# Patient Record
Sex: MALE | Race: WHITE | NOT HISPANIC OR LATINO | Employment: OTHER | URBAN - METROPOLITAN AREA
[De-identification: names, ages, dates, MRNs, and addresses within clinical notes are randomized per-mention and may not be internally consistent; named-entity substitution may affect disease eponyms.]

---

## 2018-05-15 ENCOUNTER — APPOINTMENT (OUTPATIENT)
Dept: RADIOLOGY | Facility: MEDICAL CENTER | Age: 83
End: 2018-05-15
Attending: STUDENT IN AN ORGANIZED HEALTH CARE EDUCATION/TRAINING PROGRAM
Payer: MEDICARE

## 2018-05-15 ENCOUNTER — HOSPITAL ENCOUNTER (OUTPATIENT)
Facility: MEDICAL CENTER | Age: 83
End: 2018-05-18
Attending: EMERGENCY MEDICINE | Admitting: INTERNAL MEDICINE
Payer: MEDICARE

## 2018-05-15 ENCOUNTER — APPOINTMENT (OUTPATIENT)
Dept: RADIOLOGY | Facility: MEDICAL CENTER | Age: 83
End: 2018-05-15
Attending: EMERGENCY MEDICINE
Payer: MEDICARE
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
## 2018-05-15 DIAGNOSIS — S00.83XA CONTUSION OF CHIN, INITIAL ENCOUNTER: ICD-10-CM

## 2018-05-15 DIAGNOSIS — S01.511A COMPLICATED LACERATION OF LIP, INITIAL ENCOUNTER: ICD-10-CM

## 2018-05-15 DIAGNOSIS — R04.0 EPISTAXIS: ICD-10-CM

## 2018-05-15 DIAGNOSIS — S02.92XA CLOSED FRACTURE OF FACIAL BONE DUE TO FALL, INITIAL ENCOUNTER (HCC): Primary | ICD-10-CM

## 2018-05-15 DIAGNOSIS — S02.92XA MULTIPLE FACIAL FRACTURES, CLOSED, INITIAL ENCOUNTER (HCC): ICD-10-CM

## 2018-05-15 PROBLEM — Z95.0 PACEMAKER: Status: ACTIVE | Noted: 2018-05-15

## 2018-05-15 PROBLEM — I10 ESSENTIAL HYPERTENSION: Status: ACTIVE | Noted: 2018-05-15

## 2018-05-15 PROBLEM — E11.9 TYPE 2 DIABETES MELLITUS, WITHOUT LONG-TERM CURRENT USE OF INSULIN (HCC): Status: ACTIVE | Noted: 2018-05-15

## 2018-05-15 PROBLEM — I27.20 PULMONARY HYPERTENSION (HCC): Status: ACTIVE | Noted: 2018-05-15

## 2018-05-15 PROBLEM — I34.1 MVP (MITRAL VALVE PROLAPSE): Status: ACTIVE | Noted: 2018-05-15

## 2018-05-15 PROBLEM — R74.8 ELEVATED ALKALINE PHOSPHATASE LEVEL: Status: ACTIVE | Noted: 2018-05-15

## 2018-05-15 PROBLEM — D64.9 NORMOCYTIC ANEMIA: Status: ACTIVE | Noted: 2018-05-15

## 2018-05-15 PROBLEM — E03.9 HYPOTHYROID: Status: ACTIVE | Noted: 2018-05-15

## 2018-05-15 PROBLEM — D72.829 LEUKOCYTOSIS: Status: ACTIVE | Noted: 2018-05-15

## 2018-05-15 PROBLEM — I25.810 CORONARY ARTERY DISEASE INVOLVING CORONARY BYPASS GRAFT: Status: ACTIVE | Noted: 2018-05-15

## 2018-05-15 PROBLEM — D69.6 THROMBOCYTOPENIA (HCC): Status: ACTIVE | Noted: 2018-05-15

## 2018-05-15 LAB
ABO GROUP BLD: NORMAL
ABO GROUP BLD: NORMAL
ALBUMIN SERPL BCP-MCNC: 3.6 G/DL (ref 3.2–4.9)
ALBUMIN/GLOB SERPL: 1.4 G/DL
ALP SERPL-CCNC: 130 U/L (ref 30–99)
ALT SERPL-CCNC: 21 U/L (ref 2–50)
ANION GAP SERPL CALC-SCNC: 8 MMOL/L (ref 0–11.9)
APTT PPP: 32.6 SEC (ref 24.7–36)
AST SERPL-CCNC: 23 U/L (ref 12–45)
BILIRUB SERPL-MCNC: 1 MG/DL (ref 0.1–1.5)
BLD GP AB SCN SERPL QL: NORMAL
BUN SERPL-MCNC: 12 MG/DL (ref 8–22)
CALCIUM SERPL-MCNC: 8.8 MG/DL (ref 8.5–10.5)
CHLORIDE SERPL-SCNC: 103 MMOL/L (ref 96–112)
CO2 SERPL-SCNC: 25 MMOL/L (ref 20–33)
CREAT SERPL-MCNC: 0.68 MG/DL (ref 0.5–1.4)
ERYTHROCYTE [DISTWIDTH] IN BLOOD BY AUTOMATED COUNT: 44.7 FL (ref 35.9–50)
ETHANOL BLD-MCNC: 0 G/DL
GLOBULIN SER CALC-MCNC: 2.5 G/DL (ref 1.9–3.5)
GLUCOSE SERPL-MCNC: 224 MG/DL (ref 65–99)
HCT VFR BLD AUTO: 37.6 % (ref 42–52)
HGB BLD-MCNC: 12.5 G/DL (ref 14–18)
INR PPP: 0.94 (ref 0.87–1.13)
MCH RBC QN AUTO: 29.6 PG (ref 27–33)
MCHC RBC AUTO-ENTMCNC: 33.2 G/DL (ref 33.7–35.3)
MCV RBC AUTO: 88.9 FL (ref 81.4–97.8)
PLATELET # BLD AUTO: 146 K/UL (ref 164–446)
PMV BLD AUTO: 11.4 FL (ref 9–12.9)
POTASSIUM SERPL-SCNC: 4 MMOL/L (ref 3.6–5.5)
PROT SERPL-MCNC: 6.1 G/DL (ref 6–8.2)
PROTHROMBIN TIME: 12.3 SEC (ref 12–14.6)
RBC # BLD AUTO: 4.23 M/UL (ref 4.7–6.1)
RH BLD: NORMAL
RH BLD: NORMAL
SODIUM SERPL-SCNC: 136 MMOL/L (ref 135–145)
WBC # BLD AUTO: 12.6 K/UL (ref 4.8–10.8)

## 2018-05-15 PROCEDURE — 86900 BLOOD TYPING SEROLOGIC ABO: CPT

## 2018-05-15 PROCEDURE — 86850 RBC ANTIBODY SCREEN: CPT

## 2018-05-15 PROCEDURE — 305948 HCHG GREEN TRAUMA ACT PRE-NOTIFY NO CC

## 2018-05-15 PROCEDURE — 304999 HCHG REPAIR-SIMPLE/INTERMED LEVEL 1

## 2018-05-15 PROCEDURE — G0378 HOSPITAL OBSERVATION PER HR: HCPCS

## 2018-05-15 PROCEDURE — 700111 HCHG RX REV CODE 636 W/ 250 OVERRIDE (IP): Performed by: INTERNAL MEDICINE

## 2018-05-15 PROCEDURE — 36415 COLL VENOUS BLD VENIPUNCTURE: CPT

## 2018-05-15 PROCEDURE — 85027 COMPLETE CBC AUTOMATED: CPT

## 2018-05-15 PROCEDURE — 304217 HCHG IRRIGATION SYSTEM

## 2018-05-15 PROCEDURE — 70486 CT MAXILLOFACIAL W/O DYE: CPT

## 2018-05-15 PROCEDURE — 700102 HCHG RX REV CODE 250 W/ 637 OVERRIDE(OP): Performed by: STUDENT IN AN ORGANIZED HEALTH CARE EDUCATION/TRAINING PROGRAM

## 2018-05-15 PROCEDURE — 700105 HCHG RX REV CODE 258: Performed by: INTERNAL MEDICINE

## 2018-05-15 PROCEDURE — 85610 PROTHROMBIN TIME: CPT

## 2018-05-15 PROCEDURE — 86901 BLOOD TYPING SEROLOGIC RH(D): CPT

## 2018-05-15 PROCEDURE — 303747 HCHG EXTRA SUTURE

## 2018-05-15 PROCEDURE — 96365 THER/PROPH/DIAG IV INF INIT: CPT | Mod: XU

## 2018-05-15 PROCEDURE — 85730 THROMBOPLASTIN TIME PARTIAL: CPT

## 2018-05-15 PROCEDURE — 99285 EMERGENCY DEPT VISIT HI MDM: CPT

## 2018-05-15 PROCEDURE — A9270 NON-COVERED ITEM OR SERVICE: HCPCS | Performed by: STUDENT IN AN ORGANIZED HEALTH CARE EDUCATION/TRAINING PROGRAM

## 2018-05-15 PROCEDURE — 80307 DRUG TEST PRSMV CHEM ANLYZR: CPT

## 2018-05-15 PROCEDURE — 80053 COMPREHEN METABOLIC PANEL: CPT

## 2018-05-15 PROCEDURE — 71045 X-RAY EXAM CHEST 1 VIEW: CPT

## 2018-05-15 RX ORDER — ACETAMINOPHEN 500 MG
1000 TABLET ORAL 3 TIMES DAILY
Status: DISCONTINUED | OUTPATIENT
Start: 2018-05-15 | End: 2018-05-17

## 2018-05-15 RX ORDER — LEVOTHYROXINE SODIUM 0.07 MG/1
75 TABLET ORAL
COMMUNITY

## 2018-05-15 RX ORDER — POLYETHYLENE GLYCOL 3350 17 G/17G
1 POWDER, FOR SOLUTION ORAL
Status: DISCONTINUED | OUTPATIENT
Start: 2018-05-15 | End: 2018-05-18 | Stop reason: HOSPADM

## 2018-05-15 RX ORDER — ERGOCALCIFEROL 1.25 MG/1
50000 CAPSULE ORAL
Status: DISCONTINUED | OUTPATIENT
Start: 2018-05-15 | End: 2018-05-18 | Stop reason: HOSPADM

## 2018-05-15 RX ORDER — ATORVASTATIN CALCIUM 80 MG/1
80 TABLET, FILM COATED ORAL NIGHTLY
COMMUNITY

## 2018-05-15 RX ORDER — LEVOTHYROXINE SODIUM 0.03 MG/1
75 TABLET ORAL
Status: DISCONTINUED | OUTPATIENT
Start: 2018-05-16 | End: 2018-05-18 | Stop reason: HOSPADM

## 2018-05-15 RX ORDER — BISACODYL 10 MG
10 SUPPOSITORY, RECTAL RECTAL
Status: DISCONTINUED | OUTPATIENT
Start: 2018-05-15 | End: 2018-05-18 | Stop reason: HOSPADM

## 2018-05-15 RX ORDER — TRAZODONE HYDROCHLORIDE 50 MG/1
50 TABLET ORAL
Status: DISCONTINUED | OUTPATIENT
Start: 2018-05-15 | End: 2018-05-18 | Stop reason: HOSPADM

## 2018-05-15 RX ORDER — ATORVASTATIN CALCIUM 40 MG/1
80 TABLET, FILM COATED ORAL NIGHTLY
Status: DISCONTINUED | OUTPATIENT
Start: 2018-05-15 | End: 2018-05-18 | Stop reason: HOSPADM

## 2018-05-15 RX ORDER — SODIUM CHLORIDE 9 MG/ML
INJECTION, SOLUTION INTRAVENOUS CONTINUOUS
Status: DISCONTINUED | OUTPATIENT
Start: 2018-05-15 | End: 2018-05-17

## 2018-05-15 RX ORDER — LISINOPRIL 10 MG/1
10 TABLET ORAL DAILY
COMMUNITY

## 2018-05-15 RX ORDER — LISINOPRIL 10 MG/1
10 TABLET ORAL DAILY
Status: DISCONTINUED | OUTPATIENT
Start: 2018-05-16 | End: 2018-05-18 | Stop reason: HOSPADM

## 2018-05-15 RX ORDER — ONDANSETRON 4 MG/1
4 TABLET, ORALLY DISINTEGRATING ORAL EVERY 4 HOURS PRN
Status: DISCONTINUED | OUTPATIENT
Start: 2018-05-15 | End: 2018-05-18 | Stop reason: HOSPADM

## 2018-05-15 RX ORDER — METFORMIN HYDROCHLORIDE 500 MG/1
1000 TABLET, EXTENDED RELEASE ORAL
COMMUNITY

## 2018-05-15 RX ORDER — ONDANSETRON 2 MG/ML
4 INJECTION INTRAMUSCULAR; INTRAVENOUS EVERY 4 HOURS PRN
Status: DISCONTINUED | OUTPATIENT
Start: 2018-05-15 | End: 2018-05-18 | Stop reason: HOSPADM

## 2018-05-15 RX ORDER — OXYCODONE HYDROCHLORIDE 5 MG/1
5 TABLET ORAL EVERY 4 HOURS PRN
Status: DISCONTINUED | OUTPATIENT
Start: 2018-05-15 | End: 2018-05-17

## 2018-05-15 RX ORDER — ERGOCALCIFEROL 1.25 MG/1
50000 CAPSULE ORAL
COMMUNITY

## 2018-05-15 RX ORDER — AMOXICILLIN 250 MG
2 CAPSULE ORAL 2 TIMES DAILY
Status: DISCONTINUED | OUTPATIENT
Start: 2018-05-15 | End: 2018-05-18 | Stop reason: HOSPADM

## 2018-05-15 RX ADMIN — OXYCODONE HYDROCHLORIDE 5 MG: 5 TABLET ORAL at 21:33

## 2018-05-15 RX ADMIN — ATORVASTATIN CALCIUM 80 MG: 40 TABLET, FILM COATED ORAL at 21:53

## 2018-05-15 RX ADMIN — ACETAMINOPHEN 1000 MG: 500 TABLET ORAL at 21:38

## 2018-05-15 RX ADMIN — AMPICILLIN SODIUM AND SULBACTAM SODIUM 3 G: 2; 1 INJECTION, POWDER, FOR SOLUTION INTRAMUSCULAR; INTRAVENOUS at 21:42

## 2018-05-15 RX ADMIN — SODIUM CHLORIDE: 9 INJECTION, SOLUTION INTRAVENOUS at 21:44

## 2018-05-15 ASSESSMENT — PAIN SCALES - GENERAL
PAINLEVEL_OUTOF10: 5
PAINLEVEL_OUTOF10: 8

## 2018-05-15 ASSESSMENT — LIFESTYLE VARIABLES: DO YOU DRINK ALCOHOL: NO

## 2018-05-16 LAB
ALBUMIN SERPL BCP-MCNC: 3.8 G/DL (ref 3.2–4.9)
ALBUMIN/GLOB SERPL: 1.4 G/DL
ALP SERPL-CCNC: 129 U/L (ref 30–99)
ALT SERPL-CCNC: 18 U/L (ref 2–50)
ANION GAP SERPL CALC-SCNC: 8 MMOL/L (ref 0–11.9)
AST SERPL-CCNC: 18 U/L (ref 12–45)
BASOPHILS # BLD AUTO: 0.3 % (ref 0–1.8)
BASOPHILS # BLD: 0.03 K/UL (ref 0–0.12)
BILIRUB SERPL-MCNC: 1 MG/DL (ref 0.1–1.5)
BUN SERPL-MCNC: 16 MG/DL (ref 8–22)
CALCIUM SERPL-MCNC: 8.9 MG/DL (ref 8.5–10.5)
CHLORIDE SERPL-SCNC: 104 MMOL/L (ref 96–112)
CO2 SERPL-SCNC: 24 MMOL/L (ref 20–33)
CREAT SERPL-MCNC: 0.67 MG/DL (ref 0.5–1.4)
EOSINOPHIL # BLD AUTO: 0.02 K/UL (ref 0–0.51)
EOSINOPHIL NFR BLD: 0.2 % (ref 0–6.9)
ERYTHROCYTE [DISTWIDTH] IN BLOOD BY AUTOMATED COUNT: 45 FL (ref 35.9–50)
GLOBULIN SER CALC-MCNC: 2.7 G/DL (ref 1.9–3.5)
GLUCOSE SERPL-MCNC: 167 MG/DL (ref 65–99)
HCT VFR BLD AUTO: 37.8 % (ref 42–52)
HGB BLD-MCNC: 12.8 G/DL (ref 14–18)
IMM GRANULOCYTES # BLD AUTO: 0.07 K/UL (ref 0–0.11)
IMM GRANULOCYTES NFR BLD AUTO: 0.6 % (ref 0–0.9)
LYMPHOCYTES # BLD AUTO: 1.61 K/UL (ref 1–4.8)
LYMPHOCYTES NFR BLD: 14 % (ref 22–41)
MCH RBC QN AUTO: 29.8 PG (ref 27–33)
MCHC RBC AUTO-ENTMCNC: 33.9 G/DL (ref 33.7–35.3)
MCV RBC AUTO: 88.1 FL (ref 81.4–97.8)
MONOCYTES # BLD AUTO: 1.27 K/UL (ref 0–0.85)
MONOCYTES NFR BLD AUTO: 11 % (ref 0–13.4)
NEUTROPHILS # BLD AUTO: 8.5 K/UL (ref 1.82–7.42)
NEUTROPHILS NFR BLD: 73.9 % (ref 44–72)
NRBC # BLD AUTO: 0 K/UL
NRBC BLD-RTO: 0 /100 WBC
PLATELET # BLD AUTO: 152 K/UL (ref 164–446)
PMV BLD AUTO: 10.4 FL (ref 9–12.9)
POTASSIUM SERPL-SCNC: 4.1 MMOL/L (ref 3.6–5.5)
PROT SERPL-MCNC: 6.5 G/DL (ref 6–8.2)
RBC # BLD AUTO: 4.29 M/UL (ref 4.7–6.1)
SODIUM SERPL-SCNC: 136 MMOL/L (ref 135–145)
WBC # BLD AUTO: 11.5 K/UL (ref 4.8–10.8)

## 2018-05-16 PROCEDURE — G8978 MOBILITY CURRENT STATUS: HCPCS | Mod: CI

## 2018-05-16 PROCEDURE — 80053 COMPREHEN METABOLIC PANEL: CPT

## 2018-05-16 PROCEDURE — A9270 NON-COVERED ITEM OR SERVICE: HCPCS | Performed by: STUDENT IN AN ORGANIZED HEALTH CARE EDUCATION/TRAINING PROGRAM

## 2018-05-16 PROCEDURE — 700111 HCHG RX REV CODE 636 W/ 250 OVERRIDE (IP): Performed by: INTERNAL MEDICINE

## 2018-05-16 PROCEDURE — 85025 COMPLETE CBC W/AUTO DIFF WBC: CPT

## 2018-05-16 PROCEDURE — 700105 HCHG RX REV CODE 258: Performed by: INTERNAL MEDICINE

## 2018-05-16 PROCEDURE — G8997 SWALLOW GOAL STATUS: HCPCS | Mod: CI

## 2018-05-16 PROCEDURE — 700102 HCHG RX REV CODE 250 W/ 637 OVERRIDE(OP): Performed by: STUDENT IN AN ORGANIZED HEALTH CARE EDUCATION/TRAINING PROGRAM

## 2018-05-16 PROCEDURE — G0378 HOSPITAL OBSERVATION PER HR: HCPCS

## 2018-05-16 PROCEDURE — G8980 MOBILITY D/C STATUS: HCPCS | Mod: CI

## 2018-05-16 PROCEDURE — G8987 SELF CARE CURRENT STATUS: HCPCS | Mod: CJ

## 2018-05-16 PROCEDURE — 97165 OT EVAL LOW COMPLEX 30 MIN: CPT

## 2018-05-16 PROCEDURE — 92610 EVALUATE SWALLOWING FUNCTION: CPT

## 2018-05-16 PROCEDURE — 97161 PT EVAL LOW COMPLEX 20 MIN: CPT

## 2018-05-16 PROCEDURE — 99226 PR SUBSEQUENT OBSERVATION CARE,LEVEL III: CPT | Mod: GC | Performed by: INTERNAL MEDICINE

## 2018-05-16 PROCEDURE — G8979 MOBILITY GOAL STATUS: HCPCS | Mod: CI

## 2018-05-16 PROCEDURE — 36415 COLL VENOUS BLD VENIPUNCTURE: CPT

## 2018-05-16 PROCEDURE — G8988 SELF CARE GOAL STATUS: HCPCS | Mod: CI

## 2018-05-16 PROCEDURE — 96366 THER/PROPH/DIAG IV INF ADDON: CPT

## 2018-05-16 PROCEDURE — G8996 SWALLOW CURRENT STATUS: HCPCS | Mod: CI

## 2018-05-16 RX ORDER — ECHINACEA PURPUREA EXTRACT 125 MG
2 TABLET ORAL
Status: DISCONTINUED | OUTPATIENT
Start: 2018-05-16 | End: 2018-05-18 | Stop reason: HOSPADM

## 2018-05-16 RX ORDER — FLUTICASONE PROPIONATE 50 MCG
2 SPRAY, SUSPENSION (ML) NASAL DAILY
Status: DISCONTINUED | OUTPATIENT
Start: 2018-05-16 | End: 2018-05-18 | Stop reason: HOSPADM

## 2018-05-16 RX ORDER — CHLORHEXIDINE GLUCONATE ORAL RINSE 1.2 MG/ML
15 SOLUTION DENTAL 4 TIMES DAILY PRN
Status: DISCONTINUED | OUTPATIENT
Start: 2018-05-16 | End: 2018-05-18 | Stop reason: HOSPADM

## 2018-05-16 RX ADMIN — OXYCODONE HYDROCHLORIDE 5 MG: 5 TABLET ORAL at 20:49

## 2018-05-16 RX ADMIN — ATORVASTATIN CALCIUM 80 MG: 40 TABLET, FILM COATED ORAL at 20:50

## 2018-05-16 RX ADMIN — LEVOTHYROXINE SODIUM 75 MCG: 25 TABLET ORAL at 06:23

## 2018-05-16 RX ADMIN — LISINOPRIL 10 MG: 10 TABLET ORAL at 09:26

## 2018-05-16 RX ADMIN — OXYCODONE HYDROCHLORIDE 5 MG: 5 TABLET ORAL at 16:39

## 2018-05-16 RX ADMIN — OXYCODONE HYDROCHLORIDE 5 MG: 5 TABLET ORAL at 09:26

## 2018-05-16 RX ADMIN — AMPICILLIN SODIUM AND SULBACTAM SODIUM 3 G: 2; 1 INJECTION, POWDER, FOR SOLUTION INTRAMUSCULAR; INTRAVENOUS at 04:38

## 2018-05-16 RX ADMIN — ACETAMINOPHEN 1000 MG: 500 TABLET ORAL at 09:26

## 2018-05-16 RX ADMIN — FLUTICASONE PROPIONATE 100 MCG: 50 SPRAY, METERED NASAL at 14:15

## 2018-05-16 RX ADMIN — ACETAMINOPHEN 1000 MG: 500 TABLET ORAL at 14:15

## 2018-05-16 RX ADMIN — CHLORHEXIDINE GLUCONATE 15 ML: 1.2 RINSE ORAL at 16:39

## 2018-05-16 RX ADMIN — OXYCODONE HYDROCHLORIDE 5 MG: 5 TABLET ORAL at 04:42

## 2018-05-16 RX ADMIN — ACETAMINOPHEN 1000 MG: 500 TABLET ORAL at 20:49

## 2018-05-16 RX ADMIN — AMPICILLIN SODIUM AND SULBACTAM SODIUM 3 G: 2; 1 INJECTION, POWDER, FOR SOLUTION INTRAMUSCULAR; INTRAVENOUS at 09:26

## 2018-05-16 ASSESSMENT — COGNITIVE AND FUNCTIONAL STATUS - GENERAL
SUGGESTED CMS G CODE MODIFIER MOBILITY: CH
HELP NEEDED FOR BATHING: A LITTLE
TOILETING: A LITTLE
MOBILITY SCORE: 24
PERSONAL GROOMING: A LITTLE
SUGGESTED CMS G CODE MODIFIER DAILY ACTIVITY: CJ
DAILY ACTIVITIY SCORE: 21

## 2018-05-16 ASSESSMENT — PAIN SCALES - GENERAL
PAINLEVEL_OUTOF10: 4
PAINLEVEL_OUTOF10: 6
PAINLEVEL_OUTOF10: 6
PAINLEVEL_OUTOF10: ASSUMED PAIN PRESENT

## 2018-05-16 ASSESSMENT — GAIT ASSESSMENTS
GAIT LEVEL OF ASSIST: SUPERVISED
DISTANCE (FEET): 600

## 2018-05-16 ASSESSMENT — PATIENT HEALTH QUESTIONNAIRE - PHQ9
1. LITTLE INTEREST OR PLEASURE IN DOING THINGS: NOT AT ALL
2. FEELING DOWN, DEPRESSED, IRRITABLE, OR HOPELESS: NOT AT ALL
SUM OF ALL RESPONSES TO PHQ9 QUESTIONS 1 AND 2: 0

## 2018-05-16 ASSESSMENT — LIFESTYLE VARIABLES: EVER_SMOKED: NEVER

## 2018-05-16 ASSESSMENT — ACTIVITIES OF DAILY LIVING (ADL): TOILETING: INDEPENDENT

## 2018-05-16 NOTE — CONSULTS
DATE OF SERVICE:  05/15/2018    CHIEF COMPLAINT:  Facial fractures.    HISTORY OF PRESENT ILLNESS:  Patient is an 86-year-old who has suffered a   ground level fall earlier today, landed on his face.  The patient apparently   did not lose consciousness, but was taken to the emergency room as a trauma.    He was found to have some facial injuries.  I was consulted for evaluation and   treatment of these.    PAST MEDICAL HISTORY:  Significant for history of diabetes.  He has some   hypothyroid He has a pacemaker.    MEDICATIONS:  He takes some atorvastatin, metformin, Synthroid.    ALLERGIES:  He has no known drug allergies.    SOCIAL HISTORY:  Evidently, he does not smoke.    REVIEW OF SYSTEMS:  Negative for headache, fever, visual disturbances, nausea,   vomiting, chest pain, cough, abdominal pain, hematochezia, melena, or   diarrhea.    PHYSICAL EXAMINATION:  GENERAL:  The patient is a pleasant-appearing white male who is lying in bed.  HEENT:  He has some mild abrasions about the face.  He had had some epistaxis   that seems to have stopped at this time.  He is able to open and close his   mouth.  He is edentulous.  He has no palpable step-offs.  NECK:  Supple.  CHEST:  Clear.  ABDOMEN:  Soft.  EXTREMITIES:  Without significant injury.    CT shows multiple essentially nondisplaced fractures in the mid facial region   including bilateral condylar fractures.    ASSESSMENT AND PLAN:  Given the fact that the patient's opening and closing   his mouth without significant difficulty right now, he is otherwise relatively   nontender and as he is edentulous I think what we will do is we will manage   all these fractures expectantly.  I do not think there is any reason that we   would need to take him to the operating room certainly urgently and we may not   need to take him at all.  My plan would be to just observe the patient over   the next week or so.  He could certainly be discharged as soon as he was   cleared by  medicine.  I would have him on a dental soft diet and we had to   follow him up in my office later this week.  We will be available obviously if   the patient is still in the hospital to follow him, but if he is discharged,   we will see him in my office in later this week.       ____________________________________     MD SHELLI SANCHEZ / DESTINEY    DD:  05/15/2018 21:10:44  DT:  05/15/2018 23:11:31    D#:  [id number]  Job#:  [id number]

## 2018-05-16 NOTE — THERAPY
Speech Language Therapy Clinical Swallow Evaluation completed.  Functional Status: pt sitting EOB. Sleepiness notes near end of eval probably partly  r/t recent pain medication. Pt alert, answering all questions accurately, and with intelligible speech. Vocal quality is clear. Pt with trauma and swelling to his face. Trauma to hard palate with probable wound. When pt fell, his top denture plate broke in half causing an abrasion and a laceration to pt's palate, as visualized using a flashlight. Per HandP, pt with non-displaced fx to his hard palate. Pt with probable bruising to the lateral areas of his tongue with no marked swelling. Pt denies pain with swallow. Pt given sips, using a straw, r/t moderate swelling to his lips, of thin water and juice. No delay in swallow and no coughing post swallow. Pt stating jello, given to him earlier, caused pain to his mouth and was difficult for him to manage orally. Pt given 1/2 tsp amounts of applesauce and pudding with no difficulty verbalized, and no oral residue. Pt stating he was able to take whole pills earlier without difficulty. Pt's dgtr is a RN. Educ provided to pt and his dgtr regarding full liquid diet as tolerated and s/s of possible difficulty. This SLP called by Dr. Rudd and informed that pt should have cool or lukewarm food items with avoidance of hot liquids. SLP called pt's nurs to inform her and diet order modified. SLP also called RD regarding pt and she plans to meet with pt today.   Recommendations - Diet: Diet / Liquid Recommendation: Thin full liquids as tolerated with cool and luke warm temperatures, no jello, smoothies, and Boost as tolerated.                           Strategies: Monitor during meals, HOB 90 degrees, slow rate, hold intake with difficulty                           Medication Administration: Medication Administration : Whole with Liquid Wash (as tolerated, float in applesauce as needed)  Plan of Care: Will benefit from Speech  "Therapy 3 times per week  Post-Acute Therapy: Currently anticipate no further skilled therapy needs once patient is discharged from the inpatient setting.Thanks, Nigel    See \"Rehab Therapy-Acute\" Patient Summary Report for complete documentation.   "

## 2018-05-16 NOTE — ED NOTES
87 yo male trauma green transfer for multiple facial fractures after tripping and falling forward. Denies LOC. Sent for displaced nasal bone fx, bilateral jaw fxs, orbital fx and lip laceration. Denies blood thinners.

## 2018-05-16 NOTE — DIETARY
"Nutrition services: Day 0 of admit.  Nicholas Tavares is a 86 y.o. male with admitting DX of extensive facial fractures s/p fall.  Pt seen for special dietary needs per SLP given pt's facial fractures.     Spoke with pt and daughter at bedside. At baseline pt follows a regular diet, is independent, and maintains his weight well. Pt did state that he recently bought some Boost from [employer] - had only tried 1 but said he enjoyed it. Pt unfortunately broke his dentures from his fall, and will be placed on a full liquid diet. Per discussion with SLP pt to only have cool or lukewarm items, communicated this with pt and suggested allowing soups/hot cereal to sit at room temperature for 10-15 min prior to consuming. Also discussed smoothies and Boost supplements, suggested consuming this closer to room temp as well. Pt and daughter verbalized understanding. Encouraged adequate intake of meals and supplements in order to promote healing and prevent weight loss.     Assessment:  Height: 168.9 cm (5' 6.5\")  Weight: 69.2 kg (152 lb 8.9 oz)  Body mass index is 24.25 kg/m².   Diet/Intake: Full liquid    Evaluation:   1. Per discussion with SLP pt to have cool or luke warm full liquids only - communicated with pt  2. Discussed recommendation for Boost with meals with MD - who is agreeable with plan  3. Pt does have hx of diabetes - will provide Boost Glucose Control   4. Reviewed past medical hx, labs, meds, and flowsheets    Recommendations/Plan:  1. Boost Glucose Control and yogurt based smoothies TID with meals   2. Encourage intake of meals and supplements  3. Document intake of all meals and supplements as % taken in ADL's to provide interdisciplinary communication across all shifts.   4. Monitor weight.  5. Nutrition rep will continue to see patient for ongoing meal and snack preferences.   6. RD will monitor for adequate intake, wt trends, and nutrition labs/meds    RD following           "

## 2018-05-16 NOTE — PROGRESS NOTES
Internal Medicine Interval Note  Note Author: Elli Rudd M.D.     Name Nicholas Tavares     1931   Age/Sex 86 y.o. male   MRN [id number]   Code Status FULL CODE     After 5PM or if no immediate response to page, please call for cross-coverage  Attending/Team: Dr. Liang See Patient List for primary contact information  Call [phone] to page    1st Call - Day Intern (R1):   Dr. Rudd 2nd Call - Day Sr. Resident (R2/R3):   Dr. Wallace         Reason for interval visit  (Principal Problem)   Facial fracture due to fall (HCC)    Interval Problem Daily Status Update  (24 hours)   -facial soreness, palatal pain, congestion, subjective SOB (was on oxymask when I spoke with him; however, this was removed and he continued to saturate well). Denies fevers, chills, chest pain, palpitations, lightheadedness, dizziness, n/v/d, other sx. Reports that the fall was mechanical, without prodromal sx.   -he is very independent and does not currently want HH as per OT recs  -SLT evaluated; recs: thin + full liquid, cool to lukewarm temperatures, Boost as tolerated  -PT evaluated: no further acute PT needs  -OT evaluated: noted would benefit from OT 3x/wk following discharge    ROS  Constitutional: Denies fever/chills. + weakness (progressive, months)  Eyes: Denies blurred vision, double vision.  ENT: + congestion. No clear rhinorrhea. + Palatal pain.   Cardiovascular: Denies chest pain, palpitations.  Respiratory: + subjective SOB d/t difficulty opening mouth + nasal congestion. Denies cough.  GI: Denies nausea, vomiting, diarrhea, abdominal pain.  : Denies dysuria, hematuria.  Musculo-skeletal: Denies muscle spasms, joint stiffness.  Skin: + palatal laceration (small) and facial abrasions  Neurological: Denies paresthesias, fasciculations, seizures, focal weakness.  All other systems reviewed and negative    Consultants/Specialty  Plastic surgery    Disposition  Remaining inpatient today, likely to d/c  tomorrow    Quality Measures  Quality-Core Measures   Reviewed items::  Labs reviewed and Medications reviewed  Cedillo catheter::  No Cedillo  DVT prophylaxis pharmacological::  Contraindicated - High bleeding risk  DVT prophylaxis - mechanical:  SCDs  : Unasyn discontinued.      Physical Exam       Vitals:    05/16/18 0007 05/16/18 0335 05/16/18 0730 05/16/18 0926   BP:  134/65 134/63    Pulse:  65 74    Resp:  20 18    Temp:  37.3 °C (99.1 °F) 36.5 °C (97.7 °F)    SpO2:  98% 98% 94%   Weight: 69.2 kg (152 lb 8.9 oz)      Height:         Body mass index is 24.25 kg/m². Weight: 69.2 kg (152 lb 8.9 oz)  Oxygen Therapy:  Pulse Oximetry: 94 %, O2 (LPM): 2, O2 Delivery: None (Room Air)    Physical Exam  General:  Alert and oriented, appears uncomfortable.  HENT: normocephalic, but traumatic with facial swelling, bridge of nose askew, lower lip laceration with sutures in place  Eyes: No scleral icterus, conjunctival injection, or discharge.  Lungs: Clear to auscultation bilaterally without adventitious sounds.  Cardiovascular: Regular rate and rhythm. 2+/6 systolic murmur.  Abdomen:  Benign. No rebound or guarding noted. No suprapubic tenderness.  Extremities: No clubbing, cyanosis, edema.  Musculoskeletal: right sided maxillary defect and deviated nasal septum to the right, moves jaw with speech although movement is limited  Skin: Multiple facial abrasions, lower lip suturing in place, diffuse scattered dried blood  Psych: Normal mood, judgment, insight, and affect      Lab Data Review:     Recent Labs      05/15/18   1850 05/16/18   0258   SODIUM  136  136   POTASSIUM  4.0  4.1   CHLORIDE  103  104   CO2  25  24   BUN  12  16   CREATININE  0.68  0.67   CALCIUM  8.8  8.9       Recent Labs      05/15/18   1850  05/16/18   0258   ALTSGPT  21  18   ASTSGOT  23  18   ALKPHOSPHAT  130*  129*   TBILIRUBIN  1.0  1.0   GLUCOSE  224*  167*       Recent Labs      05/15/18   1757  05/16/18   0258   RBC  4.23*  4.29*   HEMOGLOBIN   "12.5*  12.8*   HEMATOCRIT  37.6*  37.8*   PLATELETCT  146*  152*   PROTHROMBTM  12.3   --    APTT  32.6   --    INR  0.94   --        Recent Labs      05/15/18   1757  05/15/18   1850  05/16/18   0258   WBC  12.6*   --   11.5*   NEUTSPOLYS   --    --   73.90*   LYMPHOCYTES   --    --   14.00*   MONOCYTES   --    --   11.00   EOSINOPHILS   --    --   0.20   BASOPHILS   --    --   0.30   ASTSGOT   --   23  18   ALTSGPT   --   21  18   ALKPHOSPHAT   --   130*  129*   TBILIRUBIN   --   1.0  1.0           Assessment/Plan     * Facial fracture due to fall (HCC)- (present on admission)   Assessment & Plan    - mechanical fall resulting in multiple fractures of facial bones per outside CT scan  - maxillofacial CT found: \"There are multiple facial bone fractures including the nasal maxillary spine and maxillary nasal processes. Fractures extend into the hard palate. The nasal septum is acutely angulated towards the right. The anterior and inferior walls of the maxillary sinuses are fractured. Displaced fractures of the right and left mandibular condyles are demonstrated. The condylar heads are dislocated from the mandibular fossae. The zygomatic arches appear intact. The nasal bones do not appear fractured. No definite ethmoid sinus fractures. No sphenoid fracture. No definite orbital rim fracture identified. The optic globes and nerves appear to be intact. Blood is located within the maxillary sinuses. Blood is located within the nasal passages.\"   - lip laceration repaired in ED, no active gross bleeding, able to move jaw  - needs inpatient care for surgical consultation/intervention  - Dr. Brewster Surgery consulted in ED, felt patient can be discharged when medically cleared and should be f/u later this week as outpt by surgery  - pain control - per daughter very sensitive to opioids and has coded before on IV morphine. Scheduled tylenol 1000 mg tid + low dose PO roxicodone (5 mg) Q4H PRN, with nursing note to hold unless " pain not managed by tylenol  - Unasyn discontinued; flonase + saline nasal spray instead ordered  - he has small palatal laceration associated with palate fracture per nursing which is worsening his oral pain with attempted eating; have ordered chlorhexidine swish and spit (if able to swish and spit) and rinses with cool to lukewarm water after meals   - stated he had difficulty swallowing Jell-O this morning d/t pain  - does not require tetanus booster; last was 01/2016  - SLT evaluated; recs: thin + full liquid, cool to lukewarm temperatures, Boost as tolerated.  - chlorhexidine swish and spit + mouth rinse with cool to lukewarm water after meals ordered given small palatal laceration with pain  - PT evaluated: no further acute PT needs  - OT evaluated: noted would benefit from OT 3x/wk following discharge  - holding overnight to assess for complications including: incipient infection, hypoxia, new-onset focal deficits/cranial nerve palsies        MVP (mitral valve prolapse)- (present on admission)   Assessment & Plan    - [facility]  - murmur on exam  - crackles to at least mid-lung fields bilaterally  - CXR pending        Elevated alkaline phosphatase level- (present on admission)   Assessment & Plan    - will need to check records ?new finding  - outpatient follow up  - GGT ordered for tomorrow am        Leukocytosis- (present on admission)   Assessment & Plan    - may be reactive due to trauma  - recheck CBC tomorrow        Thrombocytopenia (HCC)- (present on admission)   Assessment & Plan    - many possible etiologies  - plt 146  - repeat CBC, Lovenox for DVT ppx        Normocytic anemia- (present on admission)   Assessment & Plan    - unknown amount of blood loss, unknown baseline ([facility])  - no current active bleeding  - repeat CBC tomorrow        Hypothyroid- (present on admission)   Assessment & Plan    - continue home thyroxine 75 mcg daily        Pacemaker-  (present on admission)   Assessment & Plan    - HR ~70, saw cardiology day of admission, patient states no new plans or meds        Pulmonary hypertension (HCC)- (present on admission)   Assessment & Plan    - unknown type  - [facility]  - no acute symptoms        Coronary artery disease involving coronary bypass graft- (present on admission)   Assessment & Plan    - hold home aspirin and pharmacological DVT prophx due to trauma/bleeding  - continue home atorvastatin 80 mg daily        Essential hypertension- (present on admission)   Assessment & Plan    - SBP 130s-140s  - continue lisinopril 10 mg daily        Type 2 diabetes mellitus, without long-term current use of insulin (HCC)- (present on admission)   Assessment & Plan    - hold metformin  - monitor BG on chemistry due to expected decreased PO intake from facial fracture, start ISS if needed

## 2018-05-16 NOTE — PROGRESS NOTES
2 RN skin assessment.    Abrasion to bridge of nose, back of left hand.  Dried blood and multiple bruises throughout face.  All bony prominences intact.

## 2018-05-16 NOTE — ASSESSMENT & PLAN NOTE
- asymptomatic   - GGT elevated indicating likely hepatobiliary source of elevation  - recommend outpatient follow up

## 2018-05-16 NOTE — PROGRESS NOTES
Patient passed bedside swallow evaluation with no signs/symptoms aspiration.  However, patient given jello and c/o pain with chewing.  Per patient request, patient placed on full liquid diet.

## 2018-05-16 NOTE — PROGRESS NOTES
Patient arrived floor via gurney.    Ambulated to bed with stand-by assistance.   VSS.  Tele monitoring in place, 100% paced per monitor room.    C/o 8/10 pain in right cheek/jaw, medicated per MAR.  Tolerating sips of clears, no s/s aspirations or difficulty swallowing.  Diet advanced to mechanical soft per MD order.   - BM  - void.  Call light and personal belongings within reach.  Daughter at bedside.  POC discussed and all questions answered.  Bed alarm activated.  No additional needs at this time.

## 2018-05-16 NOTE — ASSESSMENT & PLAN NOTE
- diagnosis [facility]  - murmur on exam   - crackles to at least mid-lung fields bilaterally

## 2018-05-16 NOTE — ASSESSMENT & PLAN NOTE
- home aspirin and pharmacological DVT prophx were held during admission due to trauma/bleeding  - continued home atorvastatin 80 mg daily   - at discharge would recommend restarting home meds including ASA

## 2018-05-16 NOTE — PROGRESS NOTES
Pt aao x 4, on room air,  in place. Generalized bruising noted to face. Abrasion to bridge of nose, lip and left hand. Skin care provided to remove old blood. Tylenol and oxy for pain. Up ambulating to bathroom with steady gait and sba. Tolerating full liquid diet. Plan of care discussed. Daughter at bedside.

## 2018-05-16 NOTE — CARE PLAN
Problem: Bowel/Gastric:  Goal: Normal bowel function is maintained or improved  +flatus. No BM    Problem: Knowledge Deficit  Goal: Knowledge of disease process/condition, treatment plan, diagnostic tests, and medications will improve  Plan of care discussed with pt and his daughter, Yajaira.     Problem: Pain Management  Goal: Pain level will decrease to patient's comfort goal  Scheduled tylenol in place. Oxycodone prn additional pain.

## 2018-05-16 NOTE — H&P
"      Internal Medicine Admitting History and Physical    Note Author: Jaime Tillman M.D.       Name Mckay Chaney 1931   Age/Sex 86 y.o. male   MRN [id number]   Code Status FULL     After 5PM or if no immediate response to page, please call for cross-coverage  Attending/Team: Azul/Adriana See Patient List for primary contact information  Call [phone] to page    1st Call - Day Intern (R1):   [place] 2nd Call - Day Sr. Resident (R2/R3):   [place]       Chief Complaint:  Mechanical fall and facial trauma    HPI:  Mr. Nicholas Moss is an 86 year old male with a history of bradycardia s/p pacemaker placement, 4v CABG, pulm HTN, diverticulosis, afib not on anticoagulation, MVP, hypothyroidism, T2DM not on insulin, hypertension, and dyslipidemia who was transferred from [facility] for management of facial fractures.    Patient states he was at his normally scheduled 2pm Cardiology appointment with Dr. Cardoza at the [facility] when he was leaving, stepped off a curb and \"tripped on a pebble,\" twisting his ankle, and falling on the right side of his face and chest without putting his arms out. Dentures broke as a result of the fall. States he remembers everything about the event, denies LOC, dizziness, lightheadedness, chest pain, dyspnea, weakness. Does not use assistive device for ambulation at baseline. [facility]    [facility]  - on exam had multiple abrasions, lacerations, and fractures about face  - 97.7 F, HR 70, RR 20, 96% RA, 158/116  - WBC 6.48, Hgb 13.4, Hct 39.7, Plt 178, INR 1.0  - Na 134, K 3.9, Cl 103, HCO3 23, , BUN 12, Cr 0.8, gap 12, PO4 3.0, Ca 9.4, Mg 1.8  - Alb 4.4, , AST 33, ALT 27, tbili 1.0  - CT cervical spine w/o did not show acute spinal fracture but did show bilateral mandibular condyle fractures  - CT maxillofacial w/o showed multiple facial fractures including displaced fractures of the bilateral mandibular condyles and blood in the " maxillary sinuses  - patient was transferred to [facility] for surgical management    In the [facility] ED, Surgery consultation was placed with Dr. Brewster. Patient was admitted to the surgical floor for conservative management and pain control until seen by Dr. Brewster.    ROS  Constitutional: Denies weight loss, fever, chills, weakness, malaise.  Eyes: Denies blurred vision, double vision, yellow sclerae.  ENT: Denies hearing loss, congestion, runny nose, sore throat.  Cardiovascular: Denies chest pain, palpitations.  Respiratory: Denies dyspnea, productive cough.  GI: Denies nausea, vomiting, diarrhea, abdominal pain.  : Denies dysuria, urinary urgency or frequency.  Musculo-skeletal: Denies muscle spasms, joint stiffness.  Skin: Denies change in skin, hair, nails.  Neurological: Denies paresthesias, fasciculations, seizures, focal weakness.  Psychological: Denies change in personality, affect, depression.  All others negative        Past Medical History:   Past Medical History:   Diagnosis Date   • A-fib (HCC)    • Diabetes (HCC)    • Hyperlipidemia    • Hypothyroidism    • Mitral valve prolapse    • Pacemaker    • Pulmonary hypertension (HCC)        Past Surgical History:  Past Surgical History:   Procedure Laterality Date   • OTHER CARDIAC SURGERY      aortocoronary bypass   • PACEMAKER INSERTION         Current Outpatient Medications:  Home Medications     Reviewed by Austin Pinto (Pharmacy Tech) on 05/15/18 at 1943  Med List Status: Complete   Medication Last Dose Status   aspirin EC (ECOTRIN) 81 MG Tablet Delayed Response 5/15/2018 Active   atorvastatin (LIPITOR) 80 MG tablet 5/14/2018 Active   levothyroxine (SYNTHROID) 75 MCG Tab 5/15/2018 Active   lisinopril (PRINIVIL) 10 MG Tab 5/15/2018 Active   metFORMIN ER (GLUCOPHAGE XR) 500 MG TABLET SR 24 HR 5/14/2018 Active   vitamin D, Ergocalciferol, (DRISDOL) 22764 units Cap capsule 5/13/2018 Active                Medication Allergy/Sensitivities:  No Known  "Allergies      Family History:  Multiple family members with COPD and death as a result    Social History:  Social History     Social History   • Marital status: Unknown     Spouse name: N/A   • Number of children: N/A   • Years of education: N/A     Occupational History   • Not on file.     Social History Main Topics   • Smoking status: Former Smoker     Types: Cigarettes     Quit date: 5/15/1972   • Smokeless tobacco: Never Used   • Alcohol use Yes      Comment: occ   • Drug use: No   • Sexual activity: Not on file     Other Topics Concern   • Not on file     Social History Narrative   • No narrative on file     Living situation: lives in [place] in house with 2 grandsons and grandaughter  [facility]      Physical Exam     Vitals:    05/15/18 1725 05/15/18 1734 05/15/18 1850 05/15/18 1901   BP: 145/66 147/54     Pulse: 70 78 70 70   Resp: 18 20 15 (!) 27   Temp: 37 °C (98.6 °F)      SpO2: 94% 94% 94% 94%   Weight: 68 kg (150 lb)      Height: 1.689 m (5' 6.5\")        Body mass index is 23.85 kg/m².  /54   Pulse 70   Temp 37 °C (98.6 °F)   Resp (!) 27   Ht 1.689 m (5' 6.5\")   Wt 68 kg (150 lb)   SpO2 94%   BMI 23.85 kg/m²   O2 therapy: Pulse Oximetry: 94 %, O2 (LPM): 0    Physical Exam  General:  Alert and oriented, appears uncomfortable.  Eyes: Pupils equal and reactive. No scleral icterus.  Throat: No erythema or exudates noted.  Neck: Supple. No lymphadenopathy noted.  Lungs: Clear to auscultation and percussion bilaterally.  Cardiovascular: Regular rate and rhythm. 3/6 crescendo murmur best heard at the LSB.  Abdomen:  Benign. No rebound or guarding noted.  Extremities: No clubbing, cyanosis, edema.  Musculoskeletal: right sided maxillary defect and deviated nasal septum to the right, able to move jaw during speech  Skin: Multiple facial abrasions, lower lip suturing in place, diffuse scattered dried blood  Psych: Normal mood and affect    Data Review       Old Records Request:   Deferred  Current " Records review/summary: Completed    Lab Data Review:  Recent Results (from the past 24 hour(s))   CBC WITHOUT DIFFERENTIAL    Collection Time: 05/15/18  5:57 PM   Result Value Ref Range    WBC 12.6 (H) 4.8 - 10.8 K/uL    RBC 4.23 (L) 4.70 - 6.10 M/uL    Hemoglobin 12.5 (L) 14.0 - 18.0 g/dL    Hematocrit 37.6 (L) 42.0 - 52.0 %    MCV 88.9 81.4 - 97.8 fL    MCH 29.6 27.0 - 33.0 pg    MCHC 33.2 (L) 33.7 - 35.3 g/dL    RDW 44.7 35.9 - 50.0 fL    Platelet Count 146 (L) 164 - 446 K/uL    MPV 11.4 9.0 - 12.9 fL   PROTHROMBIN TIME    Collection Time: 05/15/18  5:57 PM   Result Value Ref Range    PT 12.3 12.0 - 14.6 sec    INR 0.94 0.87 - 1.13   APTT    Collection Time: 05/15/18  5:57 PM   Result Value Ref Range    APTT 32.6 24.7 - 36.0 sec   COD (ADULT)    Collection Time: 05/15/18  5:57 PM   Result Value Ref Range    ABO Grouping Only A     Rh Grouping Only POS     Antibody Screen-Cod NEG        Imaging/Procedures Review:    Independant Imaging Review: Completed  CT-MAXILLOFACIAL W/O PLUS RECONS   Final Result      Multiple facial bone fractures including both mandibular condyles, walls of the maxillary sinuses, maxillary spine, and hard palate. Right angulation of the nasal septum.   Blood is located within the maxillary sinuses and the nasal passages.   No definite orbital rim fractures identified. Zygomatic arches appear intact.   Optic globes and nerves appear to be intact.                  Assessment/Plan     * Facial fracture due to fall (HCC)- (present on admission)   Assessment & Plan    - mechanical fall resulting in multiple fractures of facial bones per outside CT scan  - lip laceration repaired in ED, no active gross bleeding, able to move jaw  - needs inpatient care for surgical consultation/intervention  - Dr. Brewster Surgery consulted in ED, pending assessment and recs  - pain control  - Unasyn  - bedside swallow, start soft diet if passes, needs SLP eval tomorrow  - PT/OT        MVP (mitral valve prolapse)-  (present on admission)   Assessment & Plan    - diagnosis from past medical history on [facility]  - murmur on exam  - crackles to at least mid-lung fields bilaterally  - CXR pending        Elevated alkaline phosphatase level- (present on admission)   Assessment & Plan    - will need to check records ?new finding  - outpatient follow up        Leukocytosis- (present on admission)   Assessment & Plan    - may be reactive due to trauma  - recheck CBC tomorrow        Thrombocytopenia (HCC)- (present on admission)   Assessment & Plan    - many possible etiologies  - plt 146  - repeat CBC, Lovenox for DVT ppx        Normocytic anemia- (present on admission)   Assessment & Plan    - unknown amount of blood loss, unknown baseline ([facility])  - no current active bleeding  - repeat CBC tomorrow        Hypothyroid- (present on admission)   Assessment & Plan    - continue home thyroxine 75 mcg daily        Pacemaker- (present on admission)   Assessment & Plan    - HR ~70, saw cardiology day of admission, patient states no new plans or meds        Pulmonary hypertension (HCC)- (present on admission)   Assessment & Plan    - unknown type  - per medical history on [facility]  - no acute symptoms        Coronary artery disease involving coronary bypass graft- (present on admission)   Assessment & Plan    - hold home aspirin due to trauma/bleeding  - continue home atorvastatin 80 mg daily        Essential hypertension- (present on admission)   Assessment & Plan    - SBP 130s-140s  - continue lisinopril 10 mg daily        Type 2 diabetes mellitus, without long-term current use of insulin (HCC)- (present on admission)   Assessment & Plan    - hold metformin  - monitor BG on chemistry due to expected decreased PO intake from facial fracture, start ISS if needed            Anticipated Hospital stay:  >2 midnights        Quality Measures  Quality-Core Measures   Reviewed items::  Radiology images reviewed, Labs reviewed and Medications  reviewed  Cedillo catheter::  No Cedillo  DVT prophylaxis pharmacological::  Contraindicated - High bleeding risk  Ulcer Prophylaxis::  Not indicated  Antibiotics:  Treating active infection/contamination beyond 24 hours perioperative coverage

## 2018-05-16 NOTE — ED NOTES
Nasal clamp removed by ERP, epistaxis noted, ERP notified, new order received for tampon in nostril prn.

## 2018-05-16 NOTE — CARE PLAN
Problem: Nutritional:  Goal: Achieve adequate nutritional intake  Patient will consume ~50% of meals and supplements   Outcome: NOT MET

## 2018-05-16 NOTE — ASSESSMENT & PLAN NOTE
- metformin held during admission  - BGs monitored but did not require initiation of ISS this admission given relatively low elevations without solid food consumption  - resume home metformin at d/c

## 2018-05-16 NOTE — ED PROVIDER NOTES
ED Provider Note    Scribed for Erika Solorzano M.D. by Henny Alvarado. 5/15/2018, 5:28 PM.    Primary care provider: No primary care provider noted.  Means of arrival: EMS  History obtained from: Patient   History limited by: none    CHIEF COMPLAINT  Chief Complaint   Patient presents with   • Trauma Green       HPI  Mckay Chaney is a 86 y.o. male who presents to the Emergency Department by EMS as a trauma green after he had a ground level fall ambulating out of [facility] 1.5 hours ago. Patient states that he lost his balance after slipping on a pebble and hit his face to the pavement. Patient did not lose consciousness. He primarily complains of right jaw pain hat is exacerbated upon moving his jaw. Patient denies any joint pain, head pain, chest pain, or nausea associated. His last tetanus shot was in 2016. Patient has a pacemaker in place and has history of heart murmur.     Patient was given 6.5 mg of morphine prior to arrival.     Outside CT reveals: Hemorrhage  in the maxillary sinuses, nasal septal deviation. Displaced fractures to medial and lateral walls bilaterally, maxillary sinuses and nasal septum. Fracture in right lateral pterygoid plate, hard palate and bilateral mandibular condyles.     Labs reveal: Hemoglobin was 13 prior to arrival. INR was 1. glucose 201. Ct head and neck without acute disease reported.         REVIEW OF SYSTEMS  Pertinent positives include falls, right jaw pain. Pertinent negatives include no loss  of consciousness, joint pain, head pain, chest pain, or nausea  . As above, all other systems reviewed and are negative.   See HPI for further details.   C.    PAST MEDICAL HISTORY  History of heart murmur, pacemaker in place   Diabetes  Hypothyroidism    SURGICAL HISTORY  Surgical history of pacemaker insertion.      SOCIAL HISTORY     No social history noted.     FAMILY HISTORY  No family history noted    CURRENT MEDICATIONS  Atorvastatin  Vitamin  "D2  Synthroid  Ice and a pill metformin      ALLERGIES  No Known Allergies    PHYSICAL EXAM  VITAL SIGNS: /66   Pulse 70   Temp 37 °C (98.6 °F)   Resp 18   Ht 1.689 m (5' 6.5\")   Wt 68 kg (150 lb)   SpO2 94%   BMI 23.85 kg/m²   Vitals reviewed.    Consitutional: Well-developed, well-nourished. Negative for: distress.  HENT: Normocephalic. Abrasions over nasal bridge. No hemotympanum bilaterally. right external ear normal, left external ear normal, oropharynx clear and moist. No tenderness to zygomas bilaterally. Swelling to tip of nose. No nasal septal hematoma. 1cm full thickness laceration to lower lip with surrounding ecchymosis. Painful deformity to right TMJ. 1cm laceration below right lip.  Active bleeding from right nostril  Eyes: PERRLA at 3. Conjunctivae normal, extraocular movements normal. Negative for: discharge in right and left eye, icterus.  Neck: Range of motion normal, supple. Negative for c-spine tenderness and cervical adenopathy.  Cardiovascular: Normal rate, regular rhythm, intact distal pulses. Harsh murmur heard over left upper sternal border. Chest wall is stable. Negative for: rub, gallop.  Pulmonary/Chest Wall:  Effort normal, Diminished breath sounds bilaterally. Negative for: respiratory distress, wheezes, rales, rhonchi.   Abdominal: Soft, bowel sounds normal. Negative for: distention, tenderness, rebound, guarding.  Musculoskeletal: Normal range of motion.  Pelvis is stable. Negative for edema.  Neurological: Alert and oriented x3. No focal deficits.  Skin: Warm, dry. Negative for rash. Skin tear to dorsum of left hand.   Psych: Mood/affect normal, behavior normal, judgment normal.      DIAGNOSTIC STUDIES / PROCEDURES    LABS  Results for orders placed or performed during the hospital encounter of 05/15/18   CBC WITHOUT DIFFERENTIAL   Result Value Ref Range    WBC 12.6 (H) 4.8 - 10.8 K/uL    RBC 4.23 (L) 4.70 - 6.10 M/uL    Hemoglobin 12.5 (L) 14.0 - 18.0 g/dL    Hematocrit " 37.6 (L) 42.0 - 52.0 %    MCV 88.9 81.4 - 97.8 fL    MCH 29.6 27.0 - 33.0 pg    MCHC 33.2 (L) 33.7 - 35.3 g/dL    RDW 44.7 35.9 - 50.0 fL    Platelet Count 146 (L) 164 - 446 K/uL    MPV 11.4 9.0 - 12.9 fL   PROTHROMBIN TIME   Result Value Ref Range    PT 12.3 12.0 - 14.6 sec    INR 0.94 0.87 - 1.13   APTT   Result Value Ref Range    APTT 32.6 24.7 - 36.0 sec   COD (ADULT)   Result Value Ref Range    ABO Grouping Only A     Rh Grouping Only POS     Antibody Screen-Cod NEG      All labs reviewed by me.    RADIOLOGY  CT-MAXILLOFACIAL W/O PLUS RECONS   Final Result      Multiple facial bone fractures including both mandibular condyles, walls of the maxillary sinuses, maxillary spine, and hard palate. Right angulation of the nasal septum.   Blood is located within the maxillary sinuses and the nasal passages.   No definite orbital rim fractures identified. Zygomatic arches appear intact.   Optic globes and nerves appear to be intact.           The radiologist's interpretation of all radiological studies have been reviewed by me.    Laceration Repair Procedure Note    Indication: Lacerations    Procedure: The patient was placed in the appropriate position and anesthesia around the lacerations were obtained by infiltration using 1% Lidocaine with epinephrine. The area was then irrigated with high pressure normal saline. The laceration was closed with 5-0 Prolene using chromic gut sutures. A second laceration was closed with 5-0 Chromic gut sutures using interrupted sutures. The wound area was then dressed with a sterile dressing.      Total repaired wound length: 1 cm and 1cm.     Other Items: Suture count: 7    The patient tolerated the procedure well.    Complications: None        COURSE & MEDICAL DECISION MAKING  Nursing notes, VS, PMSFHx reviewed in chart.    [facility]  [facility] CT reveals: Hemorrhage  in the maxillare sinuses, nasal septal deviation, displaced fractures to  medial and lateral walls bilaterally, maxillary sinuses and nasal septum. fracture in right lateral pterygoid plate. Hard palate and bilaterally mandibular condyles.   Labs reveal: Hemoglobin was 13 prior to arrival. INR was 1. glucose 201. Ct head and neck without acute disease reported.       5:28 PM Patient seen and examined at bedside. The patient presents with facial contusions and fractures as well as a lip laceration and the differential diagnosis includes but is not limited to fracture, dislocation, open fracture. Ordered CT maxillofacial, diagnostic alcohol, CBC, CMP, Prothrombin time, APTT, COD, ABO and RH Confirmation.     5:47 PM Ordered Component cellular.     6:41 PM Paged Facial fracture.     6:45 PM - I discussed the patient's case and the above findings with Dr. Brewster (Facial Fracture) who agrees to consult and advises conservative treatment at this time.     6:49 PM Paged [facility].     6:54 PM - I discussed the patient's case and the above findings with [facility] who agrees to admit the patient.     7:00 PM Laceration repaired by me at this time. Antibiotics infused per IV. Adacel given    DISPOSITION:  Patient will be admitted to [facility] in guarded condition.      FINAL IMPRESSION  1. Multiple facial fractures, closed, initial encounter ([facility])    2. Complicated laceration of lip, initial encounter    3. Epistaxis    4. Contusion of chin, initial encounter          Henny HOLLAND (Scribe), am scribing for, and in the presence of, Erika Solorzano M.D..    Electronically signed by: Henny Alvarado (Scribe), 5/15/2018    Erika HOLLAND M.D. personally performed the services described in this documentation, as scribed by Henny Alvarado in my presence, and it is both accurate and complete.    The note accurately reflects work and decisions made by me.  Erika Solorzano  5/15/2018  7:27 PM

## 2018-05-16 NOTE — ASSESSMENT & PLAN NOTE
"Mechanical fall resulting in multiple fractures of facial bones per outside CT scan  - maxillofacial CT found: \"multiple facial bone fractures including the nasal maxillary spine and maxillary nasal processes. Fractures extend into the hard palate. The nasal septum is acutely angulated towards the right. The anterior and inferior walls of the maxillary sinuses are fractured. Displaced fractures of the right and left mandibular condyles are demonstrated. The condylar heads are dislocated from the mandibular fossae. The zygomatic arches appear intact. The nasal bones do not appear fractured. No definite ethmoid sinus fractures. No sphenoid fracture. No definite orbital rim fracture identified. The optic globes and nerves appear to be intact. Blood is located within the maxillary sinuses. Blood is located within the nasal passages.\"   - lip laceration repaired in ED, no active gross bleeding, able to move jaw wel  - Dr. Brewster Surgery consulted in ED, will f/u later this week as outpt by plastic surgery  - pain control - per daughter very sensitive to opioids and has coded before on IV morphine. Tylenol 500 mg tid + Norco Q6H PRN, with nursing note to hold unless pain not managed by tylenol. He has been requiring up to 4 tabs Norco per day + 1500 mg tylenol per day  - Unasyn initially ordered at admission but then discontinued; flonase + saline nasal spray instead ordered  - he has small palatal laceration associated with palate fracture per nursing which is worsening his oral pain with attempted eating; have ordered chlorhexidine swish and spit (if able to swish and spit) and rinses with cool to lukewarm water after meals   - did not require tetanus booster; last was 01/2016  - SLT evaluated; recs: thin + full liquid, cool to lukewarm temperatures, Boost as tolerated.  - chlorhexidine swish and spit + mouth rinse with cool to lukewarm water after meals ordered given small palatal laceration with pain  - PT evaluated: no " further acute PT needs  - OT evaluated: noted would benefit from OT 3x/wk following discharge  At discharge:   -continued chlorhexidine and water rinses as well as flonase, nasal saline spray  -plastic surgery follow up within one week  -discharge on Norco + tylenol (limit acetaminophen 3g per day)  -follow up with PCP   -OT recommended ongoing OT via , however patient refused  -can advance diet as tolerated; will send with Boosts for ongoing full liquids until diet can be advanced per dietary

## 2018-05-16 NOTE — SENIOR ADMIT NOTE
86 [facility] old male [facility] with medical history of HTN, DLD, afib, DM type 2, bradycardia s/p pacemaker, hypothyroidism 4v CABG is [facility] his multiple facial fractures. He reports going for his cardiology appointment, tripped on a pebble and fell onto the ground with his face forward and broke his dentures. Having nosebleeds, difficulty talking/breathing due to sinus pressure, skin abrasions on his nose/lower jaw with swelling and some chest discomfort.     CXR shows no rib fractures, CT scan showed multiple bone fractures of both mandibular condyles, walls of maxillary sinuses, hard palate and spone and deviation of the nasal septum. CT [facility] at [facility] showed no cervical fractures or dislocations.     Per facial surgeon, conservative management at this point and follow up in the outpatient setting.    Assessment:  1)Multiple non displaced facial fractures in the setting of mechanical ground level fall.  2) History of Mitral Valve Prolapse/CAD s/p 4v CABG  3)History of paroxysmal afib not on chronic anticoagulation  4)Leukocytosis most likely reactive     Plan:  - IV Unasyn, pain control, and follow recs from Facial surgeon.  -NPO/bedside swallow eval/SLP eval and treat, advance to dental soft diet as tolerated.        DVT Prophylaxis: SCDs  Code Status: Full Code

## 2018-05-16 NOTE — CARE PLAN
Problem: Safety  Goal: Will remain free from injury  Outcome: PROGRESSING AS EXPECTED  Bed alarm activated, room free of clutter, bed locked in lowest position, call light within reach.

## 2018-05-17 ENCOUNTER — APPOINTMENT (OUTPATIENT)
Dept: RADIOLOGY | Facility: MEDICAL CENTER | Age: 83
End: 2018-05-17
Attending: HOSPITALIST
Payer: MEDICARE

## 2018-05-17 LAB
25(OH)D3 SERPL-MCNC: 20 NG/ML (ref 30–100)
ALBUMIN SERPL BCP-MCNC: 3.8 G/DL (ref 3.2–4.9)
ALBUMIN/GLOB SERPL: 1.7 G/DL
ALP SERPL-CCNC: 124 U/L (ref 30–99)
ALT SERPL-CCNC: 13 U/L (ref 2–50)
ANION GAP SERPL CALC-SCNC: 7 MMOL/L (ref 0–11.9)
AST SERPL-CCNC: 14 U/L (ref 12–45)
BASOPHILS # BLD AUTO: 0.7 % (ref 0–1.8)
BASOPHILS # BLD: 0.05 K/UL (ref 0–0.12)
BILIRUB SERPL-MCNC: 0.9 MG/DL (ref 0.1–1.5)
BUN SERPL-MCNC: 10 MG/DL (ref 8–22)
CALCIUM SERPL-MCNC: 9 MG/DL (ref 8.5–10.5)
CHLORIDE SERPL-SCNC: 105 MMOL/L (ref 96–112)
CO2 SERPL-SCNC: 25 MMOL/L (ref 20–33)
CREAT SERPL-MCNC: 0.55 MG/DL (ref 0.5–1.4)
EOSINOPHIL # BLD AUTO: 0.12 K/UL (ref 0–0.51)
EOSINOPHIL NFR BLD: 1.6 % (ref 0–6.9)
ERYTHROCYTE [DISTWIDTH] IN BLOOD BY AUTOMATED COUNT: 45.1 FL (ref 35.9–50)
GGT SERPL-CCNC: 90 U/L (ref 7–51)
GLOBULIN SER CALC-MCNC: 2.2 G/DL (ref 1.9–3.5)
GLUCOSE SERPL-MCNC: 146 MG/DL (ref 65–99)
HCT VFR BLD AUTO: 35.8 % (ref 42–52)
HGB BLD-MCNC: 11.9 G/DL (ref 14–18)
IMM GRANULOCYTES # BLD AUTO: 0.02 K/UL (ref 0–0.11)
IMM GRANULOCYTES NFR BLD AUTO: 0.3 % (ref 0–0.9)
LYMPHOCYTES # BLD AUTO: 1.43 K/UL (ref 1–4.8)
LYMPHOCYTES NFR BLD: 18.6 % (ref 22–41)
MCH RBC QN AUTO: 29.6 PG (ref 27–33)
MCHC RBC AUTO-ENTMCNC: 33.2 G/DL (ref 33.7–35.3)
MCV RBC AUTO: 89.1 FL (ref 81.4–97.8)
MONOCYTES # BLD AUTO: 0.88 K/UL (ref 0–0.85)
MONOCYTES NFR BLD AUTO: 11.4 % (ref 0–13.4)
NEUTROPHILS # BLD AUTO: 5.19 K/UL (ref 1.82–7.42)
NEUTROPHILS NFR BLD: 67.4 % (ref 44–72)
NRBC # BLD AUTO: 0 K/UL
NRBC BLD-RTO: 0 /100 WBC
PLATELET # BLD AUTO: 129 K/UL (ref 164–446)
PMV BLD AUTO: 10.6 FL (ref 9–12.9)
POTASSIUM SERPL-SCNC: 3.9 MMOL/L (ref 3.6–5.5)
PROT SERPL-MCNC: 6 G/DL (ref 6–8.2)
RBC # BLD AUTO: 4.02 M/UL (ref 4.7–6.1)
SODIUM SERPL-SCNC: 137 MMOL/L (ref 135–145)
WBC # BLD AUTO: 7.7 K/UL (ref 4.8–10.8)

## 2018-05-17 PROCEDURE — 36415 COLL VENOUS BLD VENIPUNCTURE: CPT

## 2018-05-17 PROCEDURE — A9270 NON-COVERED ITEM OR SERVICE: HCPCS | Performed by: HOSPITALIST

## 2018-05-17 PROCEDURE — 85025 COMPLETE CBC W/AUTO DIFF WBC: CPT

## 2018-05-17 PROCEDURE — 700105 HCHG RX REV CODE 258: Performed by: INTERNAL MEDICINE

## 2018-05-17 PROCEDURE — 71100 X-RAY EXAM RIBS UNI 2 VIEWS: CPT

## 2018-05-17 PROCEDURE — 99225 PR SUBSEQUENT OBSERVATION CARE,LEVEL II: CPT | Mod: GC | Performed by: INTERNAL MEDICINE

## 2018-05-17 PROCEDURE — A9270 NON-COVERED ITEM OR SERVICE: HCPCS | Performed by: STUDENT IN AN ORGANIZED HEALTH CARE EDUCATION/TRAINING PROGRAM

## 2018-05-17 PROCEDURE — 700102 HCHG RX REV CODE 250 W/ 637 OVERRIDE(OP): Performed by: HOSPITALIST

## 2018-05-17 PROCEDURE — 92526 ORAL FUNCTION THERAPY: CPT

## 2018-05-17 PROCEDURE — 700102 HCHG RX REV CODE 250 W/ 637 OVERRIDE(OP): Performed by: STUDENT IN AN ORGANIZED HEALTH CARE EDUCATION/TRAINING PROGRAM

## 2018-05-17 PROCEDURE — G0378 HOSPITAL OBSERVATION PER HR: HCPCS

## 2018-05-17 PROCEDURE — 80053 COMPREHEN METABOLIC PANEL: CPT

## 2018-05-17 PROCEDURE — 82306 VITAMIN D 25 HYDROXY: CPT

## 2018-05-17 PROCEDURE — 82977 ASSAY OF GGT: CPT

## 2018-05-17 RX ORDER — ACETAMINOPHEN 500 MG
500 TABLET ORAL 3 TIMES DAILY
Status: DISCONTINUED | OUTPATIENT
Start: 2018-05-17 | End: 2018-05-18 | Stop reason: HOSPADM

## 2018-05-17 RX ORDER — OXYCODONE HYDROCHLORIDE 5 MG/1
5-10 TABLET ORAL EVERY 4 HOURS PRN
Status: DISCONTINUED | OUTPATIENT
Start: 2018-05-17 | End: 2018-05-17

## 2018-05-17 RX ORDER — HYDROCODONE BITARTRATE AND ACETAMINOPHEN 5; 325 MG/1; MG/1
1-2 TABLET ORAL EVERY 6 HOURS PRN
Status: DISCONTINUED | OUTPATIENT
Start: 2018-05-17 | End: 2018-05-18 | Stop reason: HOSPADM

## 2018-05-17 RX ORDER — MORPHINE SULFATE 4 MG/ML
1 INJECTION, SOLUTION INTRAMUSCULAR; INTRAVENOUS
Status: DISCONTINUED | OUTPATIENT
Start: 2018-05-17 | End: 2018-05-18

## 2018-05-17 RX ORDER — HYDROCODONE BITARTRATE AND ACETAMINOPHEN 5; 325 MG/1; MG/1
1 TABLET ORAL EVERY 8 HOURS PRN
Status: DISCONTINUED | OUTPATIENT
Start: 2018-05-17 | End: 2018-05-17

## 2018-05-17 RX ADMIN — CHLORHEXIDINE GLUCONATE 15 ML: 1.2 RINSE ORAL at 08:47

## 2018-05-17 RX ADMIN — FLUTICASONE PROPIONATE 100 MCG: 50 SPRAY, METERED NASAL at 08:47

## 2018-05-17 RX ADMIN — SODIUM CHLORIDE: 9 INJECTION, SOLUTION INTRAVENOUS at 00:05

## 2018-05-17 RX ADMIN — OXYCODONE HYDROCHLORIDE 5 MG: 5 TABLET ORAL at 00:50

## 2018-05-17 RX ADMIN — ACETAMINOPHEN 500 MG: 500 TABLET ORAL at 20:13

## 2018-05-17 RX ADMIN — STANDARDIZED SENNA CONCENTRATE AND DOCUSATE SODIUM 1 TABLET: 8.6; 5 TABLET, FILM COATED ORAL at 15:17

## 2018-05-17 RX ADMIN — STANDARDIZED SENNA CONCENTRATE AND DOCUSATE SODIUM 2 TABLET: 8.6; 5 TABLET, FILM COATED ORAL at 20:13

## 2018-05-17 RX ADMIN — LEVOTHYROXINE SODIUM 75 MCG: 25 TABLET ORAL at 07:24

## 2018-05-17 RX ADMIN — LISINOPRIL 10 MG: 10 TABLET ORAL at 08:47

## 2018-05-17 RX ADMIN — ATORVASTATIN CALCIUM 80 MG: 40 TABLET, FILM COATED ORAL at 20:13

## 2018-05-17 RX ADMIN — OXYCODONE HYDROCHLORIDE 5 MG: 5 TABLET ORAL at 03:25

## 2018-05-17 RX ADMIN — HYDROCODONE BITARTRATE AND ACETAMINOPHEN 2 TABLET: 5; 325 TABLET ORAL at 07:24

## 2018-05-17 RX ADMIN — ACETAMINOPHEN 500 MG: 500 TABLET ORAL at 15:17

## 2018-05-17 RX ADMIN — HYDROCODONE BITARTRATE AND ACETAMINOPHEN 1 TABLET: 5; 325 TABLET ORAL at 15:17

## 2018-05-17 RX ADMIN — CHLORHEXIDINE GLUCONATE 15 ML: 1.2 RINSE ORAL at 20:14

## 2018-05-17 RX ADMIN — ACETAMINOPHEN 500 MG: 500 TABLET ORAL at 08:47

## 2018-05-17 RX ADMIN — HYDROCODONE BITARTRATE AND ACETAMINOPHEN 1 TABLET: 5; 325 TABLET ORAL at 17:45

## 2018-05-17 ASSESSMENT — ENCOUNTER SYMPTOMS
DIZZINESS: 0
FALLS: 0
HALLUCINATIONS: 0
COUGH: 0
HEADACHES: 0
SINUS PAIN: 0
DOUBLE VISION: 0
BLURRED VISION: 0
SHORTNESS OF BREATH: 1
CHILLS: 0
VOMITING: 1
HEMOPTYSIS: 0
ROS SKIN COMMENTS: BRUISING
SEIZURES: 0
STRIDOR: 0
ABDOMINAL PAIN: 0
PALPITATIONS: 0
DIARRHEA: 0
NAUSEA: 1
FEVER: 0
LOSS OF CONSCIOUSNESS: 0

## 2018-05-17 ASSESSMENT — LIFESTYLE VARIABLES: SUBSTANCE_ABUSE: 0

## 2018-05-17 ASSESSMENT — PAIN SCALES - GENERAL
PAINLEVEL_OUTOF10: 7
PAINLEVEL_OUTOF10: 7
PAINLEVEL_OUTOF10: 3
PAINLEVEL_OUTOF10: 6
PAINLEVEL_OUTOF10: 5
PAINLEVEL_OUTOF10: 6

## 2018-05-17 NOTE — PROGRESS NOTES
Pt down to xray via gurney with transport. Patient oxygen sats 95% on room air with ambulation and 98% on room air at rest.

## 2018-05-17 NOTE — THERAPY
"Speech Language Therapy dysphagia treatment completed.     Functional Status:  Pt was seen for dysphagia tx at breakfast with a full liquid meal tray.  Pt was AAOx4, sitting up at edge of bed for meal.  Pt reports pain, RN aware, however willing to participate.  Pt consumed purees and thins via straw sips without any overt s/sx of aspiration.  Vocal quality remained clear and strong.  Pt is not at the level to try upgraded textures as broke his dentures and has extensive facial fractures.  Pt was educated regarding s/sx of aspiration and SLP recs and verbalized good understanding.      Recommendations: 1) Continue a Thin Full Liquid diet, as tolerated with cool and luke warm temperatures, no jello.  2) Continue with smoothies, and Boost as tolerated.     Plan of Care: Will benefit from Speech Therapy 3 times per week    Post-Acute Therapy: Currently anticipate no further skilled therapy needs once patient is discharged from the inpatient setting.    See \"Rehab Therapy-Acute\" Patient Summary Report for complete documentation.     "

## 2018-05-17 NOTE — PROGRESS NOTES
Internal Medicine Interval Note  Note Author: Santo Wallace M.D.     Name Nicholas Tavares     1931   Age/Sex 86 y.o. male   MRN [id number]   Code Status FULL CODE     After 5PM or if no immediate response to page, please call for cross-coverage  Attending/Team: Dr. Liang See Patient List for primary contact information  Call [phone] to page    1st Call - Day Intern (R1):   Dr. Rudd 2nd Call - Day Sr. Resident (R2/R3):   Dr. Wallace     Reason for interval visit  (Principal Problem)   Facial fracture due to fall (HCC)    Interval Problem Daily Status Update  (24 hours)   Patient still having jaw pain and edema.   Now complaining of right sided chest wall pain, rib series ordered to check for fractures   He is on a full liquid diet, however did have vomiting, will keep one more day to make sure he tolerates a diet      Review of Systems   Constitutional: Negative for chills and fever.   HENT: Negative for sinus pain and tinnitus.    Eyes: Negative for blurred vision and double vision.   Respiratory: Positive for shortness of breath (Nasal conjestion ). Negative for cough, hemoptysis and stridor.    Cardiovascular: Positive for chest pain (chest wall pain worse with inspiration ). Negative for palpitations.   Gastrointestinal: Positive for nausea and vomiting. Negative for abdominal pain and diarrhea.   Genitourinary: Negative for dysuria and urgency.   Musculoskeletal: Negative for falls and joint pain.   Skin:        bruising    Neurological: Negative for dizziness, seizures, loss of consciousness and headaches.   Psychiatric/Behavioral: Negative for hallucinations and substance abuse.     Consultants/Specialty  Plastic surgery    Disposition  Remaining inpatient today, d/c tomorrow    Quality Measures  Quality-Core Measures   Reviewed items::  Labs reviewed and Medications reviewed  Cedillo catheter::  No Cedillo  DVT prophylaxis pharmacological::  Contraindicated - High bleeding risk  DVT  prophylaxis - mechanical:  SCDs  : Unasyn discontinued.      Physical Exam       Vitals:    05/17/18 0000 05/17/18 0400 05/17/18 0725 05/17/18 1145   BP: 147/68 130/57 133/63 129/67   Pulse: 70 69 69 69   Resp: 20 18 18 18   Temp: 36.6 °C (97.9 °F) 36.9 °C (98.4 °F) 36.6 °C (97.9 °F) 36.9 °C (98.4 °F)   SpO2: 99% 99% 99% 97%   Weight:       Height:         Body mass index is 24.25 kg/m².    Oxygen Therapy:  Pulse Oximetry: 97 %, O2 (LPM): 1, O2 Delivery: Oxymask    Physical Exam   Constitutional: He is oriented to person, place, and time. No distress.   HENT:   palatal laceration (small) and facial abrasions   Eyes: Pupils are equal, round, and reactive to light. Right eye exhibits no discharge. Left eye exhibits no discharge.   Neck: No JVD present.   Cardiovascular: Normal rate and regular rhythm.  Exam reveals no gallop and no friction rub.    Murmur heard.  Pulmonary/Chest: Effort normal. No stridor. No respiratory distress. He has no wheezes. He has no rales.   Abdominal: Soft. Bowel sounds are normal. He exhibits no distension. There is no tenderness. There is no rebound.   Musculoskeletal: He exhibits no edema or deformity.   Neurological: He is alert and oriented to person, place, and time.   Skin: Skin is warm and dry. Rash (Bruise) noted. He is not diaphoretic.   Psychiatric: Affect and judgment normal.       Lab Data Review:     Recent Labs      05/15/18   1850  05/16/18   0258  05/17/18   0249   SODIUM  136  136  137   POTASSIUM  4.0  4.1  3.9   CHLORIDE  103  104  105   CO2  25  24  25   BUN  12  16  10   CREATININE  0.68  0.67  0.55   CALCIUM  8.8  8.9  9.0       Recent Labs      05/15/18   1850  05/16/18   0258  05/17/18   0249   ALTSGPT  21  18  13   ASTSGOT  23  18  14   ALKPHOSPHAT  130*  129*  124*   TBILIRUBIN  1.0  1.0  0.9   GAMMAGT   --    --   90*   GLUCOSE  224*  167*  146*       Recent Labs      05/15/18   1757  05/16/18   0258  05/17/18   0249   RBC  4.23*  4.29*  4.02*   HEMOGLOBIN  12.5*  " 12.8*  11.9*   HEMATOCRIT  37.6*  37.8*  35.8*   PLATELETCT  146*  152*  129*   PROTHROMBTM  12.3   --    --    APTT  32.6   --    --    INR  0.94   --    --        Recent Labs      05/15/18   1757  05/15/18   1850  05/16/18   0258  05/17/18   0249   WBC  12.6*   --   11.5*  7.7   NEUTSPOLYS   --    --   73.90*  67.40   LYMPHOCYTES   --    --   14.00*  18.60*   MONOCYTES   --    --   11.00  11.40   EOSINOPHILS   --    --   0.20  1.60   BASOPHILS   --    --   0.30  0.70   ASTSGOT   --   23  18  14   ALTSGPT   --   21  18  13   ALKPHOSPHAT   --   130*  129*  124*   TBILIRUBIN   --   1.0  1.0  0.9         Assessment/Plan     * Facial fracture due to fall (HCC)- (present on admission)   Assessment & Plan    Mechanical fall resulting in multiple fractures of facial bones per outside CT scan  - maxillofacial CT found: \"There are multiple facial bone fractures including the nasal maxillary spine and maxillary nasal processes. Fractures extend into the hard palate. The nasal septum is acutely angulated towards the right. The anterior and inferior walls of the maxillary sinuses are fractured. Displaced fractures of the right and left mandibular condyles are demonstrated. The condylar heads are dislocated from the mandibular fossae. The zygomatic arches appear intact. The nasal bones do not appear fractured. No definite ethmoid sinus fractures. No sphenoid fracture. No definite orbital rim fracture identified. The optic globes and nerves appear to be intact. Blood is located within the maxillary sinuses. Blood is located within the nasal passages.\"   - lip laceration repaired in ED, no active gross bleeding, able to move jaw  - needs inpatient care for surgical consultation/intervention  - Dr. Brewster Surgery consulted in ED, felt patient can be discharged when medically cleared and should be f/u later this week as outpt by surgery  - pain control - per daughter very sensitive to opioids and has coded before on IV morphine. "   Tylenol 500 mg tid + Norco Q6H PRN, with nursing note to hold unless pain not managed by tylenol  - Unasyn discontinued; flonase + saline nasal spray instead ordered  - he has small palatal laceration associated with palate fracture per nursing which is worsening his oral pain with attempted eating; have ordered chlorhexidine swish and spit (if able to swish and spit) and rinses with cool to lukewarm water after meals   - stated he had difficulty swallowing Jell-O this morning d/t pain  - does not require tetanus booster; last was 01/2016  - SLT evaluated; recs: thin + full liquid, cool to lukewarm temperatures, Boost as tolerated.  - chlorhexidine swish and spit + mouth rinse with cool to lukewarm water after meals ordered given small palatal laceration with pain  - PT evaluated: no further acute PT needs  - OT evaluated: noted would benefit from OT 3x/wk following discharge        MVP (mitral valve prolapse)- (present on admission)   Assessment & Plan    - [facility]  - murmur on exam   - crackles to at least mid-lung fields bilaterally        Elevated alkaline phosphatase level- (present on admission)   Assessment & Plan    - will need to check records ?new finding  - asymptomatic   - outpatient follow up        Leukocytosis- (present on admission)   Assessment & Plan    - may be reactive due to trauma   - improved         Thrombocytopenia (HCC)- (present on admission)   Assessment & Plan    - plt 146  - no active bleeding, apart from the trauma site          Normocytic anemia- (present on admission)   Assessment & Plan    - unknown amount of blood loss, unknown baseline ([facility])  - no current active bleeding         Hypothyroid- (present on admission)   Assessment & Plan    - continue home thyroxine 75 mcg daily          Pacemaker- (present on admission)   Assessment & Plan    - HR ~70, saw cardiology day of admission, patient states no new plans or meds          Pulmonary hypertension (HCC)- (present on admission)   Assessment & Plan    - unknown type  - [facility]  - no acute symptoms         Coronary artery disease involving coronary bypass graft- (present on admission)   Assessment & Plan    - hold home aspirin and pharmacological DVT prophx due to trauma/bleeding  - continue home atorvastatin 80 mg daily         Essential hypertension- (present on admission)   Assessment & Plan    - continue lisinopril 10 mg daily         Type 2 diabetes mellitus, without long-term current use of insulin (HCC)- (present on admission)   Assessment & Plan    - hold metformin  - monitor BG on chemistry due to expected decreased PO intake from facial fracture, start ISS if needed

## 2018-05-17 NOTE — PROGRESS NOTES
Patient complaining of 7/10 pain despite administration of oxycodone per MAR.  Pain located in bilateral jaw, and right rib cage/sternum.  Patient describes pain as sore/throbbing.  SpO2 95% on RA while awake.  Lung sounds clear/diminished.  APRN notified.

## 2018-05-17 NOTE — CARE PLAN
Problem: Safety  Goal: Will remain free from injury  Outcome: PROGRESSING AS EXPECTED  Bed alarm activated, call light within reach, bed locked in lowest position, upper side rails up, room free of clutter, non-skid socks on patient.     Problem: Venous Thromboembolism (VTW)/Deep Vein Thrombosis (DVT) Prevention:  Goal: Patient will participate in Venous Thrombosis (VTE)/Deep Vein Thrombosis (DVT)Prevention Measures  Outcome: PROGRESSING AS EXPECTED  SCDs in place, patient ambulating.

## 2018-05-17 NOTE — CARE PLAN
Problem: Safety  Goal: Will remain free from injury  Bed alarm on. Pt calling appropriately.    Problem: Knowledge Deficit  Goal: Knowledge of disease process/condition, treatment plan, diagnostic tests, and medications will improve  Plan of care discussed with pt. Pt receptive to plan.     Problem: Pain Management  Goal: Pain level will decrease to patient's comfort goal  Pt with increase in pain today. Medications changed. Will monitor effectiveness.

## 2018-05-17 NOTE — FACE TO FACE
Face to Face Supporting Documentation - Home Health    The encounter with this patient was in whole or in part the primary reason for home health admission.    Date of encounter:   Patient:                    MRN:                       YOB: 2018  Nicholas Tavares  [id number]  7/26/1931     Home health to see patient for:  Occupational therapy evaluation and treatment    Skilled need for:  New Onset Medical Diagnosis Facial fracture due to fall    Homebound status evidenced by:  Needs the assistance of another person in order to leave the home. Leaving home requires a considerable and taxing effort. There is a normal inability to leave the home.    Community Physician to provide follow up care: Pcp Pt States None     Optional Interventions? No    I certify the face to face encounter for this home health care referral meets the CMS requirements and the encounter/clinical assessment with the patient was, in whole, or in part, for the medical condition(s) listed above, which is the primary reason for home health care. Based on my clinical findings: the service(s) are medically necessary, support the need for home health care, and the homebound criteria are met.  I certify that this patient has had a face to face encounter by myself.  Santo Wallace M.D. - NPI: [id number]

## 2018-05-18 VITALS
DIASTOLIC BLOOD PRESSURE: 54 MMHG | WEIGHT: 152.56 LBS | OXYGEN SATURATION: 94 % | SYSTOLIC BLOOD PRESSURE: 123 MMHG | TEMPERATURE: 98.4 F | HEIGHT: 67 IN | HEART RATE: 70 BPM | RESPIRATION RATE: 17 BRPM | BODY MASS INDEX: 23.94 KG/M2

## 2018-05-18 LAB
ANION GAP SERPL CALC-SCNC: 8 MMOL/L (ref 0–11.9)
BASOPHILS # BLD AUTO: 0.4 % (ref 0–1.8)
BASOPHILS # BLD: 0.04 K/UL (ref 0–0.12)
BUN SERPL-MCNC: 10 MG/DL (ref 8–22)
CALCIUM SERPL-MCNC: 9.1 MG/DL (ref 8.5–10.5)
CHLORIDE SERPL-SCNC: 104 MMOL/L (ref 96–112)
CO2 SERPL-SCNC: 25 MMOL/L (ref 20–33)
CREAT SERPL-MCNC: 0.52 MG/DL (ref 0.5–1.4)
EOSINOPHIL # BLD AUTO: 0.14 K/UL (ref 0–0.51)
EOSINOPHIL NFR BLD: 1.5 % (ref 0–6.9)
ERYTHROCYTE [DISTWIDTH] IN BLOOD BY AUTOMATED COUNT: 45.4 FL (ref 35.9–50)
GLUCOSE SERPL-MCNC: 165 MG/DL (ref 65–99)
HCT VFR BLD AUTO: 35.3 % (ref 42–52)
HGB BLD-MCNC: 11.3 G/DL (ref 14–18)
IMM GRANULOCYTES # BLD AUTO: 0.03 K/UL (ref 0–0.11)
IMM GRANULOCYTES NFR BLD AUTO: 0.3 % (ref 0–0.9)
LYMPHOCYTES # BLD AUTO: 1.24 K/UL (ref 1–4.8)
LYMPHOCYTES NFR BLD: 13.6 % (ref 22–41)
MAGNESIUM SERPL-MCNC: 2 MG/DL (ref 1.5–2.5)
MCH RBC QN AUTO: 28.5 PG (ref 27–33)
MCHC RBC AUTO-ENTMCNC: 32 G/DL (ref 33.7–35.3)
MCV RBC AUTO: 88.9 FL (ref 81.4–97.8)
MONOCYTES # BLD AUTO: 1.13 K/UL (ref 0–0.85)
MONOCYTES NFR BLD AUTO: 12.4 % (ref 0–13.4)
NEUTROPHILS # BLD AUTO: 6.51 K/UL (ref 1.82–7.42)
NEUTROPHILS NFR BLD: 71.8 % (ref 44–72)
NRBC # BLD AUTO: 0 K/UL
NRBC BLD-RTO: 0 /100 WBC
PLATELET # BLD AUTO: 132 K/UL (ref 164–446)
PMV BLD AUTO: 10.7 FL (ref 9–12.9)
POTASSIUM SERPL-SCNC: 3.8 MMOL/L (ref 3.6–5.5)
RBC # BLD AUTO: 3.97 M/UL (ref 4.7–6.1)
SODIUM SERPL-SCNC: 137 MMOL/L (ref 135–145)
WBC # BLD AUTO: 9.1 K/UL (ref 4.8–10.8)

## 2018-05-18 PROCEDURE — 700102 HCHG RX REV CODE 250 W/ 637 OVERRIDE(OP): Performed by: HOSPITALIST

## 2018-05-18 PROCEDURE — 700101 HCHG RX REV CODE 250: Performed by: HOSPITALIST

## 2018-05-18 PROCEDURE — 36415 COLL VENOUS BLD VENIPUNCTURE: CPT

## 2018-05-18 PROCEDURE — 85025 COMPLETE CBC W/AUTO DIFF WBC: CPT

## 2018-05-18 PROCEDURE — A9270 NON-COVERED ITEM OR SERVICE: HCPCS | Performed by: HOSPITALIST

## 2018-05-18 PROCEDURE — 80048 BASIC METABOLIC PNL TOTAL CA: CPT

## 2018-05-18 PROCEDURE — 700102 HCHG RX REV CODE 250 W/ 637 OVERRIDE(OP): Performed by: STUDENT IN AN ORGANIZED HEALTH CARE EDUCATION/TRAINING PROGRAM

## 2018-05-18 PROCEDURE — 83735 ASSAY OF MAGNESIUM: CPT

## 2018-05-18 PROCEDURE — 99217 PR OBSERVATION CARE DISCHARGE: CPT | Mod: GC | Performed by: INTERNAL MEDICINE

## 2018-05-18 PROCEDURE — G0378 HOSPITAL OBSERVATION PER HR: HCPCS

## 2018-05-18 PROCEDURE — A9270 NON-COVERED ITEM OR SERVICE: HCPCS | Performed by: STUDENT IN AN ORGANIZED HEALTH CARE EDUCATION/TRAINING PROGRAM

## 2018-05-18 RX ORDER — LIDOCAINE 50 MG/G
1 PATCH TOPICAL EVERY 24 HOURS
Status: DISCONTINUED | OUTPATIENT
Start: 2018-05-18 | End: 2018-05-18 | Stop reason: HOSPADM

## 2018-05-18 RX ORDER — POLYETHYLENE GLYCOL 3350 17 G/17G
17 POWDER, FOR SOLUTION ORAL
Qty: 15 EACH | Refills: 0 | Status: SHIPPED | OUTPATIENT
Start: 2018-05-18

## 2018-05-18 RX ORDER — FLUTICASONE PROPIONATE 50 MCG
2 SPRAY, SUSPENSION (ML) NASAL DAILY
Qty: 1 BOTTLE | Refills: 0 | Status: SHIPPED | OUTPATIENT
Start: 2018-05-19

## 2018-05-18 RX ORDER — ECHINACEA PURPUREA EXTRACT 125 MG
2 TABLET ORAL
Qty: 1 BOTTLE | Refills: 3 | Status: SHIPPED | OUTPATIENT
Start: 2018-05-18

## 2018-05-18 RX ORDER — CHLORHEXIDINE GLUCONATE ORAL RINSE 1.2 MG/ML
15 SOLUTION DENTAL 4 TIMES DAILY
Qty: 1 BOTTLE | Refills: 0 | Status: SHIPPED | OUTPATIENT
Start: 2018-05-18

## 2018-05-18 RX ORDER — HYDROCODONE BITARTRATE AND ACETAMINOPHEN 5; 325 MG/1; MG/1
1 TABLET ORAL EVERY 8 HOURS PRN
Qty: 21 TAB | Refills: 0 | Status: SHIPPED | OUTPATIENT
Start: 2018-05-18 | End: 2018-05-25

## 2018-05-18 RX ORDER — ACETAMINOPHEN 500 MG
500 TABLET ORAL 3 TIMES DAILY PRN
Qty: 60 TAB | Refills: 0 | Status: SHIPPED | OUTPATIENT
Start: 2018-05-18

## 2018-05-18 RX ORDER — AMOXICILLIN 250 MG
2 CAPSULE ORAL 2 TIMES DAILY
Qty: 30 TAB | Refills: 0 | Status: SHIPPED | OUTPATIENT
Start: 2018-05-18

## 2018-05-18 RX ORDER — LIDOCAINE 50 MG/G
1 PATCH TOPICAL EVERY 24 HOURS
Qty: 10 PATCH | Refills: 1 | Status: SHIPPED | OUTPATIENT
Start: 2018-05-19

## 2018-05-18 RX ADMIN — LEVOTHYROXINE SODIUM 75 MCG: 25 TABLET ORAL at 06:41

## 2018-05-18 RX ADMIN — CHLORHEXIDINE GLUCONATE 15 ML: 1.2 RINSE ORAL at 09:22

## 2018-05-18 RX ADMIN — LISINOPRIL 10 MG: 10 TABLET ORAL at 09:22

## 2018-05-18 RX ADMIN — HYDROCODONE BITARTRATE AND ACETAMINOPHEN 2 TABLET: 5; 325 TABLET ORAL at 00:24

## 2018-05-18 RX ADMIN — ACETAMINOPHEN 500 MG: 500 TABLET ORAL at 09:22

## 2018-05-18 RX ADMIN — HYDROCODONE BITARTRATE AND ACETAMINOPHEN 2 TABLET: 5; 325 TABLET ORAL at 16:45

## 2018-05-18 RX ADMIN — HYDROCODONE BITARTRATE AND ACETAMINOPHEN 2 TABLET: 5; 325 TABLET ORAL at 06:41

## 2018-05-18 RX ADMIN — FLUTICASONE PROPIONATE 100 MCG: 50 SPRAY, METERED NASAL at 09:22

## 2018-05-18 RX ADMIN — MAGNESIUM HYDROXIDE 30 ML: 400 SUSPENSION ORAL at 06:40

## 2018-05-18 RX ADMIN — LIDOCAINE 1 PATCH: 50 PATCH TOPICAL at 06:42

## 2018-05-18 RX ADMIN — ACETAMINOPHEN 500 MG: 500 TABLET ORAL at 16:37

## 2018-05-18 ASSESSMENT — PAIN SCALES - GENERAL
PAINLEVEL_OUTOF10: 7
PAINLEVEL_OUTOF10: 7
PAINLEVEL_OUTOF10: 4
PAINLEVEL_OUTOF10: 6

## 2018-05-18 NOTE — DISCHARGE PLANNING
ATTN: Case Management  RE: Referral for Home Health    Reason for referral denial: We do not accept VA insurance                We would like to take this opportunity to thank you for submitting a referral for your patient to continue the journey to recovery with [facility]. We hope to facilitate continued referrals from your facility as our goal is to provide quality, skilled care to as many patients as possible.            Unfortunately, we are not able to accept your patient into our service for the reason listed above. If further clarity is needed, our Intake Coordinators are available to discuss any barriers to service.            If you have any questions or concerns regarding this or future patients’ transition to Home Health, please do not hesitate to contact us. We are open for referrals 7 days a week from 8AM to 5PM at [phone].      We look forward to collaborating with you in the future,  [facility] Team

## 2018-05-18 NOTE — DISCHARGE PLANNING
[facility] has placed call to [facility], per , Meenu (admisisons) will review referral and call [facility] back.

## 2018-05-18 NOTE — DISCHARGE PLANNING
[facility] has received Home Health Choice form from CHANEL Serrano at [phone].     [facility] has sent referral to [facility] at [phone]

## 2018-05-18 NOTE — DISCHARGE SUMMARY
Internal Medicine Discharge Summary  Note Author: Elli Rudd M.D.       Admit Date:  5/15/2018       Discharge Date:   5/18/2018    Service:   [facility] Internal Medicine White Team  Attending Physician(s):   Dr. Liang       Senior Resident(s):   Dr. Wallace  Damian Resident(s):   Dr. Rudd      Primary Diagnosis:   Facial fractures due to fall    Secondary Diagnoses:                Principal Problem:    Facial fracture due to fall (HCC) POA: Yes  Active Problems:    MVP (mitral valve prolapse) POA: Yes    Normocytic anemia POA: Yes    Thrombocytopenia (HCC) POA: Yes    Leukocytosis POA: Yes    Elevated alkaline phosphatase level POA: Yes    Type 2 diabetes mellitus, without long-term current use of insulin (HCC) POA: Yes    Essential hypertension POA: Yes    Coronary artery disease involving coronary bypass graft POA: Yes    Pulmonary hypertension (HCC) POA: Yes    Pacemaker POA: Yes    Hypothyroid POA: Yes  Resolved Problems:    * No resolved hospital problems. *      Hospital Summary (Brief Narrative):       Mr. Tavares is an 86M who presented on 5/15/18 following a mechanical ground-level fall at the [facility] following a cardiology appointment which injured his face and broke his dentures. He has PMHx of hypertension, atrial fibrillation without anticoagulation, mitral valve prolapse, hypothyroidism, non-insulin-dependent type 2 diabetes; last tetanus booster 01/2016. He denied prodromal symptoms prior to the fall, loss consciousness, chest pain, dyspnea, or weakness, and does not use any assistive devices with ambulating at baseline. [facility] at which time his vitals were stable, he had no leukocytosis, only very mild anemia with Hgb 13.4, unremarkable CMP but for mild alkaline phosphatase elevation to 179. CT of the cervical spine showed no acute fractures. CT maxillofacial without contrast showed displaced fractures of the bilateral mandibular  "condyles with dislocation of the condylar heads from the mandibular fossae, nasal maxillary spine, maxillary nasal processes (anterior and inferior), with fracture extension into the hard palate, deviation of the nasal septum to the right, and blood collection in the maxillary sinuses. No orbital fractures were seen. [place] [facility] [place] and small lip laceration repaired in ED; he was then seen by Dr. Brewster (plastic surgery) in ED, who felt there was no need for urgent surgery and that the patient could be discharged when medically cleared for close follow-up outpatient with plastic surgery within a week. He was admitted for medical observation, and made NPO until SLT evaluated the next day; they recommended thin + full liquids with cool to lukewarm temps + Boost to ensure proper nutrition. Chlorhexidine mouthwash and cool to lukewarm water rinse post-meals was also ordered due to small palatal laceration. He seemed to tolerate this well for the following day, but was then kept for one more day of observation on 5/17 due to an episode of vomiting, to ensure he could tolerate diet outpatient. On 5/17 a rib series also ordered d/t complaint of chest pain and revealed no e/o fracture. Today he feels well, denies n/v, SOB, fevers, chills; reports chest pain reproducible with palpation which likely represents rib bruising. He is cleared to discharge with plan for close follow-up with plastic surgery. He has refused home health.     Patient /Hospital Summary (Details -- Problem Oriented) :          MVP (mitral valve prolapse)   Assessment & Plan    - [facility]  - murmur on exam   - crackles to at least mid-lung fields bilaterally        * Facial fracture due to fall (HCC)   Assessment & Plan    Mechanical fall resulting in multiple fractures of facial bones per outside CT scan  - maxillofacial CT found: \"multiple facial bone fractures including the nasal " "maxillary spine and maxillary nasal processes. Fractures extend into the hard palate. The nasal septum is acutely angulated towards the right. The anterior and inferior walls of the maxillary sinuses are fractured. Displaced fractures of the right and left mandibular condyles are demonstrated. The condylar heads are dislocated from the mandibular fossae. The zygomatic arches appear intact. The nasal bones do not appear fractured. No definite ethmoid sinus fractures. No sphenoid fracture. No definite orbital rim fracture identified. The optic globes and nerves appear to be intact. Blood is located within the maxillary sinuses. Blood is located within the nasal passages.\"   - lip laceration repaired in ED, no active gross bleeding, able to move jaw wel  - Dr. Brewster Surgery consulted in ED, will f/u later this week as outpt by plastic surgery  - pain control - per daughter very sensitive to opioids and has coded before on IV morphine. Tylenol 500 mg tid + Norco Q6H PRN, with nursing note to hold unless pain not managed by tylenol. He has been requiring up to 4 tabs Norco per day + 1500 mg tylenol per day  - Unasyn initially ordered at admission but then discontinued; flonase + saline nasal spray instead ordered  - he has small palatal laceration associated with palate fracture per nursing which is worsening his oral pain with attempted eating; have ordered chlorhexidine swish and spit (if able to swish and spit) and rinses with cool to lukewarm water after meals   - did not require tetanus booster; last was 01/2016  - SLT evaluated; recs: thin + full liquid, cool to lukewarm temperatures, Boost as tolerated.  - chlorhexidine swish and spit + mouth rinse with cool to lukewarm water after meals ordered given small palatal laceration with pain  - PT evaluated: no further acute PT needs  - OT evaluated: noted would benefit from OT 3x/wk following discharge  At discharge:   -continued chlorhexidine and water rinses as well " as flonase, nasal saline spray  -plastic surgery follow up within one week  -discharge on Norco + tylenol (limit acetaminophen 3g per day)  -follow up with PCP   -OT recommended ongoing OT via HH, however patient refused  -can advance diet as tolerated; will send with Boosts for ongoing full liquids until diet can be advanced per dietary        Elevated alkaline phosphatase level   Assessment & Plan    - asymptomatic   - GGT elevated indicating likely hepatobiliary source of elevation  - recommend outpatient follow up        Leukocytosis   Assessment & Plan    - likely reactive due to trauma   - RESOLVED        Thrombocytopenia (HCC)   Assessment & Plan    - plt 146  - no active bleeding, apart from the trauma site (now resolved)        Normocytic anemia   Assessment & Plan    - unknown amount of blood loss, unknown baseline ([facility])  - no current active bleeding         Hypothyroid   Assessment & Plan    - continue home thyroxine 75 mcg daily          Pacemaker   Assessment & Plan    - HR ~70, saw cardiology day of admission, patient states no new plans or meds         Pulmonary hypertension (HCC)   Assessment & Plan    - unknown type  - [facility]  - no acute symptoms         Coronary artery disease involving coronary bypass graft   Assessment & Plan    - home aspirin and pharmacological DVT prophx were held during admission due to trauma/bleeding  - continued home atorvastatin 80 mg daily   - at discharge would recommend restarting home meds including ASA        Essential hypertension   Assessment & Plan    - lisinopril 10 mg daily continued during admission and at discharge        Type 2 diabetes mellitus, without long-term current use of insulin (HCC)   Assessment & Plan    - metformin held during admission  - BGs monitored but did not require initiation of ISS this admission given relatively low elevations without solid food consumption  - resume home metformin at d/c                   Consultants:     Plastic surgery    Procedures:        -lip laceration repair    Imaging/ Testing:      [id number] (W/O CXR)   Final Result      No evidence of rib fracture or pneumothorax.      Enlarged cardiac silhouette.      DX-CHEST-PORTABLE (1 VIEW)   Final Result      1.  No acute cardiopulmonary disease.   2.  Multichamber cardiac enlargement.      CT-MAXILLOFACIAL W/O PLUS RECONS   Final Result      Multiple facial bone fractures including both mandibular condyles, walls of the maxillary sinuses, maxillary spine, and hard palate. Right angulation of the nasal septum.   Blood is located within the maxillary sinuses and the nasal passages.   No definite orbital rim fractures identified. Zygomatic arches appear intact.   Optic globes and nerves appear to be intact.             Discharge Medications:         Medication Reconciliation: Completed       Medication List      START taking these medications      Instructions   acetaminophen 500 MG Tabs  Commonly known as:  TYLENOL   Take 1 Tab by mouth 3 times a day as needed.  Dose:  500 mg     chlorhexidine 0.12 % Soln  Commonly known as:  PERIDEX   Take 15 mL by mouth 4 times a day. Swish and spit.  Dose:  15 mL     fluticasone 50 MCG/ACT nasal spray  Start taking on:  5/19/2018  Commonly known as:  FLONASE   Spray 2 Sprays in nose every day.  Dose:  2 Spray     HYDROcodone-acetaminophen 5-325 MG Tabs per tablet  Commonly known as:  NORCO   Take 1 Tab by mouth every 8 hours as needed for up to 7 days.  Dose:  1 Tab     lidocaine 5 % Ptch  Start taking on:  5/19/2018  Commonly known as:  LIDODERM   Apply 1 Patch to skin as directed every 24 hours.  Dose:  1 Patch     polyethylene glycol/lytes Pack  Commonly known as:  MIRALAX   Take 1 Packet by mouth 1 time daily as needed (if sennosides and docusate ineffective for constipation after 24 hours).  Dose:  17 g     senna-docusate 8.6-50 MG Tabs  Commonly known as:  PERICOLACE or SENOKOT S   Take 2 Tabs by  mouth 2 Times a Day.  Dose:  2 Tab     sodium chloride 0.65 % Soln  Commonly known as:  OCEAN   Spray 2 Sprays in nose every 2 hours as needed.  Dose:  2 Spray        CONTINUE taking these medications      Instructions   aspirin EC 81 MG Tbec  Commonly known as:  ECOTRIN   Take 81 mg by mouth every day.  Dose:  81 mg     atorvastatin 80 MG tablet  Commonly known as:  LIPITOR   Take 80 mg by mouth every evening.  Dose:  80 mg     levothyroxine 75 MCG Tabs  Commonly known as:  SYNTHROID   Take 75 mcg by mouth Every morning on an empty stomach.  Dose:  75 mcg     lisinopril 10 MG Tabs  Commonly known as:  PRINIVIL   Take 10 mg by mouth every day.  Dose:  10 mg     metFORMIN  MG Tb24  Commonly known as:  GLUCOPHAGE XR   Take 1,000 mg by mouth with dinner.  Dose:  1000 mg     vitamin D (Ergocalciferol) 19758 units Caps capsule  Commonly known as:  DRISDOL   Take 50,000 Units by mouth every 7 days.  Dose:  48425 Units            Can use .DISCHARGEMEDSLIST if going to another facility         Disposition:   To home, HH referral placed    Diet:   Full liquids, diabetic with Boost supplement; advance as tolerated    Activity:   As tolerated    Instructions:      Advised on medications and to f/u with PCP and plastic surgery.    The patient was instructed to return to the ER in the event of worsening symptoms. I have counseled the patient on the importance of compliance and the patient has agreed to proceed with all medical recommendations and follow up plan indicated above.   The patient understands that all medications come with benefits and risks. Risks may include permanent injury or death and these risks can be minimized with close reassessment and monitoring.        Primary Care Provider:     Discharge summary faxed to primary care provider:  Deferred  Copy of discharge summary given to the patient: Deferred    Follow up appointment details :      [facility]   -should place referral to plastic surgery for  f/u    Pending Studies:        N/A    Time spent on discharge day patient visit, preparing discharge paperwork and arranging for patient follow up.    Summary of follow up issues:   -with PCP for post-hospitalization f/u and referral to plastic surgery  -to be assessed by Plastic Surgery outpatient within next week for possible surgery    Discharge Time (Minutes) :    30  Hospital Course Type: Observation Stay      Condition on Discharge  Stable  ______________________________________________________________________    Interval history/exam for day of discharge:    Pt feels well. Continues to have chest pain reproducible with palpation. Denies fevers/chills, SOB, palpitations, n/v/d, vision changes, lightheadedness/dizziness, or other sx. He is mentally clear.     Vitals:    05/17/18 2000 05/18/18 0000 05/18/18 0400 05/18/18 0755   BP: 127/66 139/67 127/58 120/55   Pulse: 69 69 69 71   Resp: 18 17 16 18   Temp: 36.8 °C (98.2 °F) 36.6 °C (97.9 °F) 36.8 °C (98.2 °F) 36.9 °C (98.4 °F)   SpO2: 92% 96% 96% 93%   Weight:       Height:         Weight/BMI: Body mass index is 24.25 kg/m².  Pulse Oximetry: 93 %, O2 (LPM): 1, O2 Delivery: Nasal Cannula    General:  Alert and oriented, appears more comfortable than on previous exams. Ecchymoses over face/chin now appear darker.  HENT: normocephalic, but traumatic with facial swelling, bridge of nose askew, lower lip laceration with sutures in place, ecchymoses more visible than previously  Eyes: No scleral icterus, conjunctival injection, or discharge.  Lungs: Clear to auscultation bilaterally without adventitious sounds.  Cardiovascular: Regular rate and rhythm. 2+/6 systolic murmur.  Abdomen:  Benign. No rebound or guarding noted. No suprapubic tenderness.  Extremities: No clubbing, cyanosis, edema.  Musculoskeletal: right sided maxillary defect and deviated nasal septum to the right, moves jaw with speech and movement less limited than on previous exams  Skin: Multiple  facial abrasions, lower lip suturing in place  Psych: Normal mood, judgment, insight, and affect    Most Recent Labs:    Lab Results   Component Value Date/Time    WBC 9.1 05/18/2018 03:02 AM    RBC 3.97 (L) 05/18/2018 03:02 AM    HEMOGLOBIN 11.3 (L) 05/18/2018 03:02 AM    HEMATOCRIT 35.3 (L) 05/18/2018 03:02 AM    MCV 88.9 05/18/2018 03:02 AM    MCH 28.5 05/18/2018 03:02 AM    MCHC 32.0 (L) 05/18/2018 03:02 AM    MPV 10.7 05/18/2018 03:02 AM    NEUTSPOLYS 71.80 05/18/2018 03:02 AM    LYMPHOCYTES 13.60 (L) 05/18/2018 03:02 AM    MONOCYTES 12.40 05/18/2018 03:02 AM    EOSINOPHILS 1.50 05/18/2018 03:02 AM    BASOPHILS 0.40 05/18/2018 03:02 AM      Lab Results   Component Value Date/Time    SODIUM 137 05/18/2018 03:02 AM    POTASSIUM 3.8 05/18/2018 03:02 AM    CHLORIDE 104 05/18/2018 03:02 AM    CO2 25 05/18/2018 03:02 AM    GLUCOSE 165 (H) 05/18/2018 03:02 AM    BUN 10 05/18/2018 03:02 AM    CREATININE 0.52 05/18/2018 03:02 AM      Lab Results   Component Value Date/Time    ALTSGPT 13 05/17/2018 02:49 AM    ASTSGOT 14 05/17/2018 02:49 AM    ALKPHOSPHAT 124 (H) 05/17/2018 02:49 AM    TBILIRUBIN 0.9 05/17/2018 02:49 AM    ALBUMIN 3.8 05/17/2018 02:49 AM    GLOBULIN 2.2 05/17/2018 02:49 AM    INR 0.94 05/15/2018 05:57 PM     Lab Results   Component Value Date/Time    PROTHROMBTM 12.3 05/15/2018 05:57 PM    INR 0.94 05/15/2018 05:57 PM

## 2018-05-18 NOTE — DISCHARGE PLANNING
[facility] received denial letter for patient from [facility].    CHANEL Serrano has been notified.

## 2018-05-18 NOTE — DISCHARGE PLANNING
[facility] has received HH Choice form from CHANEL Serrano.   Referral has been faxed to [facility].     [facility] will follow up with admissions.

## 2018-05-18 NOTE — DISCHARGE PLANNING
Received call from Meenu with [facility] they are accepting and wanted to know when pt is to dc. Transferred to [facility].

## 2018-05-18 NOTE — DIETARY
Nutrition Services:    MD called requesting diet education on full liquid diet and advancement to soft diet. Pt was very receptive and understood information. Handout provided.     RD available prn

## 2018-05-18 NOTE — CARE PLAN
Problem: Safety  Goal: Will remain free from injury  Outcome: PROGRESSING AS EXPECTED  Bed alarm activated, call light within reach, bed locked in lowest position, upper side rails up, room free of clutter, non-skid socks on patient.     Problem: Venous Thromboembolism (VTW)/Deep Vein Thrombosis (DVT) Prevention:  Goal: Patient will participate in Venous Thrombosis (VTE)/Deep Vein Thrombosis (DVT)Prevention Measures  Outcome: PROGRESSING AS EXPECTED  Patient ambulating, SCDs in place.

## 2018-05-18 NOTE — DISCHARGE PLANNING
TCN received PC form Daughter Kateryna who was agreeable to HH and Selected [facility] . Kateryna states daughter who lives with patient  (Iris) is not around often during the day due to work and tends to be out gambling at night. Kateryna voiced concerns regarding condition of patient house. Kateryna did not feel and EPS referral was warranted but stated patient is likely concerned due to condition of his home and limited assist from daughter. TCN requested SW/RN CM complete assessment with patient prior to DC. Choice faxed to [facility].

## 2018-05-18 NOTE — CARE PLAN
Problem: Nutritional:  Goal: Achieve adequate nutritional intake  Patient will consume ~50% of meals and supplements    Outcome: MET Date Met: 05/18/18  Per chart pt PO % most meals and 25-75% of supplements. Pt is eating well. RD available prn.

## 2018-05-18 NOTE — PROGRESS NOTES
Bedside report completed.  A&O x4.    VSS.  SpO2 92% on RA.  Tele monitoring in place.    Ambulating with stand-by assistance, steady gait.  Denies N/V at this time.  Tolerating full liquid diet.   C/o 3/10 pain, will medicate per MAR.  + flatus, - BM.  + void.  Call light and personal belongings within reach.  POC discussed and all questions answered.  SCDs in place.  No additional needs at this time.

## 2018-05-18 NOTE — DISCHARGE PLANNING
TCN met with patient at bedside to discuss the care teams recommendation for HH. Patient was very anxious regarding his DC plan and requested TCN contact his daughters to discuss recommendation for HH. TCN left VM for both daughters listed on facesheet. RN CM aware of discussion. Choice to be obtained as able.

## 2018-05-18 NOTE — DISCHARGE PLANNING
CHANEL spoke with daughter Kateryna who requested choice for HH be changed to [facility]. Choice faxed to [facility].

## 2018-05-18 NOTE — CARE PLAN
Problem: Knowledge Deficit  Goal: Knowledge of disease process/condition, treatment plan, diagnostic tests, and medications will improve  Plan of care discussed with pt. Await MD rounding    Problem: Pain Management  Goal: Pain level will decrease to patient's comfort goal  norco with adequate pain relief. Scheduled tylenol in place also

## 2018-05-18 NOTE — DISCHARGE PLANNING
CHANEL received notice that [facility] was unable to accept patient. CHANEL lft VM for Daughter Kateryna to make second choice. Awaiting call back.

## 2018-05-19 NOTE — DISCHARGE INSTRUCTIONS
Facial Fracture  A facial fracture is a break in one of the bones of your face.    HOME CARE INSTRUCTIONS   · Protect the injured part of your face until it is healed.  · Do not participate in activities which give chance for re-injury until your doctor approves.  · Gently wash and dry your face.    SEEK MEDICAL CARE IF:   · An oral temperature above 102° F (38.9° C) develops.  · You have severe headaches or notice changes in your vision.  · You have new numbness or tingling in your face.  · You develop nausea (feeling sick to your stomach), vomiting or a stiff neck.    SEEK IMMEDIATE MEDICAL CARE IF:   · You develop difficulty seeing or experience double vision.  · You become dizzy, lightheaded, or faint.  · You develop trouble speaking, breathing, or swallowing.  · You have a watery discharge from your nose or ear.    MAKE SURE YOU:   · Understand these instructions.  · Will watch your condition.  · Will get help right away if you are not doing well or get worse.  Document Released: 12/18/2006 Document Revised: 03/11/2013 Document Reviewed: 08/06/2009  [employer]® Patient Information ©2014 [employer].      Mandibular Fracture  A mandibular fracture is a break in the jawbone.    CAUSES   The most common cause of mandibular fracture is a direct blow (trauma) to the jaw. This could happen from:  · A car crash.  · Physical violence.  · A fall from a high place.    SYMPTOMS   · Pain.  · Swelling.  · Difficulty and pain when closing the mouth.  · Feeling that the teeth are not aligned properly when closing the mouth (malocclusion).  · Difficulty speaking.  · Difficulty swallowing.    DIAGNOSIS   Your caregiver will take your history and perform a physical exam. He or she may also order imaging tests, such as X-rays or a computed tomography (CT) scan, to confirm your diagnosis.    TREATMENT   Surgery is often needed to put the jaw back in the right position. Wires are usually placed around the teeth to hold the jaw in  place while it heals. Treatment may also include pain medicine, ice, and a soft or liquid diet.    HOME CARE INSTRUCTIONS   · Put ice on the injured area.  ¨ Put ice in a plastic bag.  ¨ Place a towel between your skin and the bag.  ¨ Leave the ice on for 15-20 minutes, 03-04 times a day for the first 2 days.  · Only take over-the-counter or prescription medicines for pain, discomfort, or fever as directed by your caregiver.  · Eat a well-balanced, high-protein soft or liquid diet as directed by your caregiver.  · If your jaws are wired, follow your caregiver's instructions for wired jaw care.  · Sleep on your back to avoid putting pressure on your jaw.  · Avoid exercising to the point that you become short of breath.  SEEK MEDICAL CARE IF:   · You have a severe headache or numbness in your face.  · You have severe jaw pain that is not relieved with medicine.  · Your jaw wires become loose.  · You have uncontrollable nausea or anxiety.  · Your swelling or redness gets worse.    SEEK IMMEDIATE MEDICAL CARE IF:  · You have a fever.  · You have difficulty breathing.  · You feel like your airway is tightening.  · You cannot swallow your saliva.  · You make a high-pitched whistling sound when you breathe (wheezing).    MAKE SURE YOU:   · Understand these instructions.  · Will watch your condition.  · Will get help right away if you are not doing well or get worse.  This information is not intended to replace advice given to you by your health care provider. Make sure you discuss any questions you have with your health care provider.  Document Released: 12/18/2006 Document Revised: 01/08/2016 Document Reviewed: 09/11/2016  [employer] Interactive Patient Education © 2017 [employer]        Discharge Instructions    Discharged to home by car with relative. Discharged via wheelchair, hospital escort: Yes.  Special equipment needed: Not Applicable    Be sure to schedule a follow-up appointment with your primary care doctor or  any specialists as instructed.     Discharge Plan:   Diet Plan: Discussed  Activity Level: Discussed  Confirmed Follow up Appointment: Patient to Call and Schedule Appointment  Confirmed Symptoms Management: Discussed  Medication Reconciliation Updated: Yes  Influenza Vaccine Indication: Not indicated: Previously immunized this influenza season and > 8 years of age    I understand that a diet low in cholesterol, fat, and sodium is recommended for good health. Unless I have been given specific instructions below for another diet, I accept this instruction as my diet prescription.   Other diet: full liquid diet as tolerated    Special Instructions: None    · Is patient discharged on Warfarin / Coumadin?   No     Depression / Suicide Risk    As you are discharged from this [facility] facility, it is important to learn how to keep safe from harming yourself.    Recognize the warning signs:  · Abrupt changes in personality, positive or negative- including increase in energy   · Giving away possessions  · Change in eating patterns- significant weight changes-  positive or negative  · Change in sleeping patterns- unable to sleep or sleeping all the time   · Unwillingness or inability to communicate  · Depression  · Unusual sadness, discouragement and loneliness  · Talk of wanting to die  · Neglect of personal appearance   · Rebelliousness- reckless behavior  · Withdrawal from people/activities they love  · Confusion- inability to concentrate     If you or a loved one observes any of these behaviors or has concerns about self-harm, here's what you can do:  · Talk about it- your feelings and reasons for harming yourself  · Remove any means that you might use to hurt yourself (examples: pills, rope, extension cords, firearm)  · Get professional help from the community (Mental Health, Substance Abuse, psychological counseling)  · Do not be alone:Call your Safe Contact- someone whom you trust who will be there for  you.  · Call your local CRISIS HOTLINE 566-6387 or 244-833-0386  · Call your local Children's Mobile Crisis Response Team [place] (103) 098-9358 or www.[employer]  · Call the toll free National Suicide Prevention Hotlines   · National Suicide Prevention Lifeline 520-600-DBTE (0121)  · [employer] 800-SUICIDE (717-1389)

## 2018-05-19 NOTE — PROGRESS NOTES
Discharging Patient home per physician order.  Discharged with daughter Iris--left to vehicle via wheelchair with RN.  Demonstrated understanding of discharge instructions, follow up appointments, home medications, prescriptions, home care for wound, and nursing care instructions for diet and activity.  Ambulating without assistance, voiding without difficulty, pain well controlled, tolerating oral medications, oxygen saturation greater than 90% on ra, tolerating diet.   Educational handouts re: worsening symptoms, facial fractures and jaw fractures given and discussed.  Verbalized understanding of discharge instructions and educational handouts.  All questions answered.  Belongings with patient at time of discharge.

## 2018-05-19 NOTE — PROGRESS NOTES
Pt aao x 4, up ambulating ad clif with steady gait in room. Tolerating full liquid diet. Pain managed via norco and lidocaine patch. Pt voiding to toilet. Small BM today. Bowel care discussed with pt. Plan of care discussed with patient's daughters. Await discharge orders and prescriptions from MD.

## 2020-05-22 NOTE — ED NOTES
Called pt's son Freddie back. Advised DNR POLST form can be completed at pt's [facility] on 05/28/20, verbalized understanding, appreciative of call back.   Report from Elton KRUSE, patient moved to blue 18 at this time

## 2024-04-15 NOTE — THERAPY
"Occupational Therapy Evaluation completed.   Functional Status:  OT eval completed on 85 YO M admitted after GLF. Pt with multiple non-displaced facial fractures. Pt demonstrated functional transfers and grooming while standing with CGA/SPV. Pt with 3 LOB during tx session and required tx assist to self-correct. Pt with decreased safety awareness during functional transfers. Pt required redirection and reminders back to task at hand throughout session. Pt tangential with speech and required redirection back to original question at times. Pt limited due to pain, decreased activity tolerance and cognition. Will continue to follow for acute OT services while in-house.  Plan of Care: Will benefit from Occupational Therapy 3 times per week  Discharge Recommendations:  Equipment: Will Continue to Assess for Equipment Needs. Post-acute therapy Discharge to home with outpatient or home health    See \"Rehab Therapy-Acute\" Patient Summary Report for complete documentation.    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 Should follow up with PCP